# Patient Record
Sex: MALE | HISPANIC OR LATINO | URBAN - METROPOLITAN AREA
[De-identification: names, ages, dates, MRNs, and addresses within clinical notes are randomized per-mention and may not be internally consistent; named-entity substitution may affect disease eponyms.]

---

## 2022-11-25 ENCOUNTER — OFFICE (OUTPATIENT)
Dept: URBAN - METROPOLITAN AREA CLINIC 94 | Facility: CLINIC | Age: 47
Setting detail: OPHTHALMOLOGY
End: 2022-11-25

## 2022-11-25 DIAGNOSIS — Z96.1: ICD-10-CM

## 2022-11-25 DIAGNOSIS — E11.3532: ICD-10-CM

## 2022-11-25 DIAGNOSIS — H25.041: ICD-10-CM

## 2022-11-25 DIAGNOSIS — H25.89: ICD-10-CM

## 2022-11-25 PROCEDURE — 99212 OFFICE O/P EST SF 10 MIN: CPT | Performed by: OPHTHALMOLOGY

## 2022-11-25 PROCEDURE — 92250 FUNDUS PHOTOGRAPHY W/I&R: CPT | Performed by: OPHTHALMOLOGY

## 2022-11-25 ASSESSMENT — KERATOMETRY
OD_K1POWER_DIOPTERS: UTP
OS_AXISANGLE_DEGREES: 005
METHOD_AUTO_MANUAL: AUTO
OS_K2POWER_DIOPTERS: 45.00
OS_K1POWER_DIOPTERS: 42.25

## 2022-11-25 ASSESSMENT — REFRACTION_MANIFEST
OD_SPHERE: -5.75
OD_CYLINDER: -0.75
OS_CYLINDER: -1.00
OS_AXIS: 062
OD_AXIS: 085
OS_SPHERE: +1.00
OS_VA1: 20/40
OD_VA1: HM

## 2022-11-25 ASSESSMENT — VISUAL ACUITY
OD_BCVA: 20/400
OS_BCVA: 20/HM

## 2022-11-25 ASSESSMENT — SPHEQUIV_DERIVED
OS_SPHEQUIV: 0.5
OD_SPHEQUIV: -6.125
OS_SPHEQUIV: -0.5

## 2022-11-25 ASSESSMENT — CONFRONTATIONAL VISUAL FIELD TEST (CVF)
OD_FINDINGS: FULL
OS_FINDINGS: FULL

## 2022-11-25 ASSESSMENT — REFRACTION_AUTOREFRACTION
OD_CYLINDER: UTP
OD_SPHERE: UTP
OD_AXIS: UTP
OS_CYLINDER: -3.50
OS_AXIS: 097
OS_SPHERE: +1.25

## 2022-11-25 ASSESSMENT — TONOMETRY
OD_IOP_MMHG: 15
OS_IOP_MMHG: 15

## 2022-11-25 ASSESSMENT — AXIALLENGTH_DERIVED
OS_AL: 23.3536
OS_AL: 23.7417

## 2022-11-30 ENCOUNTER — OFFICE (OUTPATIENT)
Dept: URBAN - METROPOLITAN AREA CLINIC 94 | Facility: CLINIC | Age: 47
Setting detail: OPHTHALMOLOGY
End: 2022-11-30

## 2022-11-30 DIAGNOSIS — H25.89: ICD-10-CM

## 2022-11-30 DIAGNOSIS — E11.3511: ICD-10-CM

## 2022-11-30 DIAGNOSIS — E11.3512: ICD-10-CM

## 2022-11-30 PROBLEM — H25.11 CATARACT SENILE NUCLEAR SCLEROSIS; RIGHT EYE: Status: ACTIVE | Noted: 2022-11-25

## 2022-11-30 PROCEDURE — 67028 INJECTION EYE DRUG: CPT | Performed by: OPHTHALMOLOGY

## 2022-11-30 PROCEDURE — 92014 COMPRE OPH EXAM EST PT 1/>: CPT | Performed by: OPHTHALMOLOGY

## 2022-11-30 PROCEDURE — 92134 CPTRZ OPH DX IMG PST SGM RTA: CPT | Performed by: OPHTHALMOLOGY

## 2022-11-30 ASSESSMENT — REFRACTION_AUTOREFRACTION
OS_AXIS: 097
OS_CYLINDER: -3.50
OD_AXIS: UTP
OD_CYLINDER: UTP
OS_SPHERE: +1.25
OD_SPHERE: UTP

## 2022-11-30 ASSESSMENT — REFRACTION_MANIFEST
OS_SPHERE: +1.00
OD_CYLINDER: -0.75
OD_VA1: HM
OS_AXIS: 062
OD_AXIS: 085
OS_CYLINDER: -1.00
OS_VA1: 20/40
OD_SPHERE: -5.75

## 2022-11-30 ASSESSMENT — CONFRONTATIONAL VISUAL FIELD TEST (CVF)
OD_FINDINGS: FULL
OS_FINDINGS: FULL

## 2022-11-30 ASSESSMENT — VISUAL ACUITY
OD_BCVA: 20/250
OS_BCVA: HM

## 2022-11-30 ASSESSMENT — SPHEQUIV_DERIVED
OS_SPHEQUIV: -0.5
OD_SPHEQUIV: -6.125
OS_SPHEQUIV: 0.5

## 2022-11-30 ASSESSMENT — TONOMETRY
OS_IOP_MMHG: 21
OD_IOP_MMHG: 20

## 2023-01-13 ENCOUNTER — OFFICE (OUTPATIENT)
Dept: URBAN - METROPOLITAN AREA CLINIC 94 | Facility: CLINIC | Age: 48
Setting detail: OPHTHALMOLOGY
End: 2023-01-13
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
## 2023-01-13 PROCEDURE — NO SHOW FE NO SHOW FEE: Performed by: OPHTHALMOLOGY

## 2023-02-20 ENCOUNTER — INPATIENT (INPATIENT)
Facility: HOSPITAL | Age: 48
LOS: 6 days | Discharge: HOME | DRG: 286 | End: 2023-02-27
Attending: FAMILY MEDICINE | Admitting: STUDENT IN AN ORGANIZED HEALTH CARE EDUCATION/TRAINING PROGRAM
Payer: MEDICAID

## 2023-02-20 VITALS
OXYGEN SATURATION: 98 % | RESPIRATION RATE: 18 BRPM | DIASTOLIC BLOOD PRESSURE: 48 MMHG | TEMPERATURE: 99 F | HEART RATE: 98 BPM | SYSTOLIC BLOOD PRESSURE: 66 MMHG

## 2023-02-20 DIAGNOSIS — I21.4 NON-ST ELEVATION (NSTEMI) MYOCARDIAL INFARCTION: ICD-10-CM

## 2023-02-20 DIAGNOSIS — E11.9 TYPE 2 DIABETES MELLITUS WITHOUT COMPLICATIONS: ICD-10-CM

## 2023-02-20 DIAGNOSIS — N17.9 ACUTE KIDNEY FAILURE, UNSPECIFIED: ICD-10-CM

## 2023-02-20 LAB
ALBUMIN SERPL ELPH-MCNC: 3.2 G/DL — LOW (ref 3.3–5.2)
ALP SERPL-CCNC: 162 U/L — HIGH (ref 40–120)
ALT FLD-CCNC: 6 U/L — SIGNIFICANT CHANGE UP
ANION GAP SERPL CALC-SCNC: 13 MMOL/L — SIGNIFICANT CHANGE UP (ref 5–17)
APTT BLD: 35.6 SEC — HIGH (ref 27.5–35.5)
AST SERPL-CCNC: 14 U/L — SIGNIFICANT CHANGE UP
BASE EXCESS BLDV CALC-SCNC: 5.7 MMOL/L — HIGH (ref -2–3)
BASOPHILS # BLD AUTO: 0.03 K/UL — SIGNIFICANT CHANGE UP (ref 0–0.2)
BASOPHILS NFR BLD AUTO: 0.3 % — SIGNIFICANT CHANGE UP (ref 0–2)
BILIRUB SERPL-MCNC: 0.2 MG/DL — LOW (ref 0.4–2)
BUN SERPL-MCNC: 29.4 MG/DL — HIGH (ref 8–20)
CA-I SERPL-SCNC: 1.16 MMOL/L — SIGNIFICANT CHANGE UP (ref 1.15–1.33)
CALCIUM SERPL-MCNC: 9 MG/DL — SIGNIFICANT CHANGE UP (ref 8.4–10.5)
CHLORIDE BLDV-SCNC: 93 MMOL/L — LOW (ref 96–108)
CHLORIDE SERPL-SCNC: 90 MMOL/L — LOW (ref 96–108)
CO2 SERPL-SCNC: 27 MMOL/L — SIGNIFICANT CHANGE UP (ref 22–29)
CREAT SERPL-MCNC: 1.36 MG/DL — HIGH (ref 0.5–1.3)
EGFR: 65 ML/MIN/1.73M2 — SIGNIFICANT CHANGE UP
EOSINOPHIL # BLD AUTO: 0.02 K/UL — SIGNIFICANT CHANGE UP (ref 0–0.5)
EOSINOPHIL NFR BLD AUTO: 0.2 % — SIGNIFICANT CHANGE UP (ref 0–6)
GAS PNL BLDV: 131 MMOL/L — LOW (ref 136–145)
GAS PNL BLDV: SIGNIFICANT CHANGE UP
GLUCOSE BLDV-MCNC: 155 MG/DL — HIGH (ref 70–99)
GLUCOSE SERPL-MCNC: 148 MG/DL — HIGH (ref 70–99)
HCO3 BLDV-SCNC: 31 MMOL/L — HIGH (ref 22–29)
HCT VFR BLD CALC: 27.2 % — LOW (ref 39–50)
HCT VFR BLDA CALC: 29 % — SIGNIFICANT CHANGE UP
HGB BLD CALC-MCNC: 9.7 G/DL — LOW (ref 12.6–17.4)
HGB BLD-MCNC: 8.8 G/DL — LOW (ref 13–17)
IMM GRANULOCYTES NFR BLD AUTO: 0.4 % — SIGNIFICANT CHANGE UP (ref 0–0.9)
INR BLD: 1.3 RATIO — HIGH (ref 0.88–1.16)
LACTATE BLDV-MCNC: 1.1 MMOL/L — SIGNIFICANT CHANGE UP (ref 0.5–2)
LYMPHOCYTES # BLD AUTO: 1.75 K/UL — SIGNIFICANT CHANGE UP (ref 1–3.3)
LYMPHOCYTES # BLD AUTO: 18.4 % — SIGNIFICANT CHANGE UP (ref 13–44)
MCHC RBC-ENTMCNC: 27 PG — SIGNIFICANT CHANGE UP (ref 27–34)
MCHC RBC-ENTMCNC: 32.4 GM/DL — SIGNIFICANT CHANGE UP (ref 32–36)
MCV RBC AUTO: 83.4 FL — SIGNIFICANT CHANGE UP (ref 80–100)
MONOCYTES # BLD AUTO: 0.69 K/UL — SIGNIFICANT CHANGE UP (ref 0–0.9)
MONOCYTES NFR BLD AUTO: 7.3 % — SIGNIFICANT CHANGE UP (ref 2–14)
NEUTROPHILS # BLD AUTO: 6.96 K/UL — SIGNIFICANT CHANGE UP (ref 1.8–7.4)
NEUTROPHILS NFR BLD AUTO: 73.4 % — SIGNIFICANT CHANGE UP (ref 43–77)
PCO2 BLDV: 52 MMHG — SIGNIFICANT CHANGE UP (ref 42–55)
PH BLDV: 7.38 — SIGNIFICANT CHANGE UP (ref 7.32–7.43)
PLATELET # BLD AUTO: 587 K/UL — HIGH (ref 150–400)
PO2 BLDV: 45 MMHG — SIGNIFICANT CHANGE UP (ref 25–45)
POTASSIUM BLDV-SCNC: 4.7 MMOL/L — SIGNIFICANT CHANGE UP (ref 3.5–5.1)
POTASSIUM SERPL-MCNC: 4.8 MMOL/L — SIGNIFICANT CHANGE UP (ref 3.5–5.3)
POTASSIUM SERPL-SCNC: 4.8 MMOL/L — SIGNIFICANT CHANGE UP (ref 3.5–5.3)
PROT SERPL-MCNC: 8.1 G/DL — SIGNIFICANT CHANGE UP (ref 6.6–8.7)
PROTHROM AB SERPL-ACNC: 15.1 SEC — HIGH (ref 10.5–13.4)
RBC # BLD: 3.26 M/UL — LOW (ref 4.2–5.8)
RBC # FLD: 14.8 % — HIGH (ref 10.3–14.5)
SAO2 % BLDV: 74.1 % — SIGNIFICANT CHANGE UP
SARS-COV-2 RNA SPEC QL NAA+PROBE: SIGNIFICANT CHANGE UP
SODIUM SERPL-SCNC: 130 MMOL/L — LOW (ref 135–145)
TROPONIN T SERPL-MCNC: 0.23 NG/ML — HIGH (ref 0–0.06)
TSH SERPL-MCNC: 1.45 UIU/ML — SIGNIFICANT CHANGE UP (ref 0.27–4.2)
WBC # BLD: 9.49 K/UL — SIGNIFICANT CHANGE UP (ref 3.8–10.5)
WBC # FLD AUTO: 9.49 K/UL — SIGNIFICANT CHANGE UP (ref 3.8–10.5)

## 2023-02-20 PROCEDURE — 71045 X-RAY EXAM CHEST 1 VIEW: CPT | Mod: 26

## 2023-02-20 PROCEDURE — 99223 1ST HOSP IP/OBS HIGH 75: CPT

## 2023-02-20 PROCEDURE — 93010 ELECTROCARDIOGRAM REPORT: CPT | Mod: 76

## 2023-02-20 PROCEDURE — 99291 CRITICAL CARE FIRST HOUR: CPT

## 2023-02-20 RX ORDER — SODIUM CHLORIDE 9 MG/ML
1000 INJECTION, SOLUTION INTRAVENOUS ONCE
Refills: 0 | Status: COMPLETED | OUTPATIENT
Start: 2023-02-20 | End: 2023-02-20

## 2023-02-20 RX ORDER — SODIUM CHLORIDE 9 MG/ML
2000 INJECTION, SOLUTION INTRAVENOUS ONCE
Refills: 0 | Status: COMPLETED | OUTPATIENT
Start: 2023-02-20 | End: 2023-02-20

## 2023-02-20 RX ORDER — INFLUENZA VIRUS VACCINE 15; 15; 15; 15 UG/.5ML; UG/.5ML; UG/.5ML; UG/.5ML
0.5 SUSPENSION INTRAMUSCULAR ONCE
Refills: 0 | Status: DISCONTINUED | OUTPATIENT
Start: 2023-02-20 | End: 2023-02-27

## 2023-02-20 RX ORDER — HEPARIN SODIUM 5000 [USP'U]/ML
2900 INJECTION INTRAVENOUS; SUBCUTANEOUS ONCE
Refills: 0 | Status: COMPLETED | OUTPATIENT
Start: 2023-02-20 | End: 2023-02-20

## 2023-02-20 RX ORDER — HEPARIN SODIUM 5000 [USP'U]/ML
INJECTION INTRAVENOUS; SUBCUTANEOUS
Qty: 25000 | Refills: 0 | Status: DISCONTINUED | OUTPATIENT
Start: 2023-02-20 | End: 2023-02-22

## 2023-02-20 RX ORDER — ASPIRIN/CALCIUM CARB/MAGNESIUM 324 MG
324 TABLET ORAL ONCE
Refills: 0 | Status: COMPLETED | OUTPATIENT
Start: 2023-02-20 | End: 2023-02-20

## 2023-02-20 RX ORDER — HEPARIN SODIUM 5000 [USP'U]/ML
2900 INJECTION INTRAVENOUS; SUBCUTANEOUS EVERY 6 HOURS
Refills: 0 | Status: DISCONTINUED | OUTPATIENT
Start: 2023-02-20 | End: 2023-02-22

## 2023-02-20 RX ADMIN — SODIUM CHLORIDE 1000 MILLILITER(S): 9 INJECTION, SOLUTION INTRAVENOUS at 18:59

## 2023-02-20 RX ADMIN — SODIUM CHLORIDE 2000 MILLILITER(S): 9 INJECTION, SOLUTION INTRAVENOUS at 17:06

## 2023-02-20 RX ADMIN — HEPARIN SODIUM 600 UNIT(S)/HR: 5000 INJECTION INTRAVENOUS; SUBCUTANEOUS at 18:53

## 2023-02-20 RX ADMIN — Medication 324 MILLIGRAM(S): at 18:50

## 2023-02-20 RX ADMIN — SODIUM CHLORIDE 2000 MILLILITER(S): 9 INJECTION, SOLUTION INTRAVENOUS at 19:12

## 2023-02-20 RX ADMIN — HEPARIN SODIUM 2900 UNIT(S): 5000 INJECTION INTRAVENOUS; SUBCUTANEOUS at 18:50

## 2023-02-20 NOTE — ED PROVIDER NOTE - CLINICAL SUMMARY MEDICAL DECISION MAKING FREE TEXT BOX
47y Male with weakness and near syncope without chest pain or shortness of breath noted to be hypotensive on arrival. On examination neurovascularly intact with soft, nontender abdomen. Concerning for but not limited to ACS, arrythmia, thyroid disorder, electrolyte derangement. 47y Male with weakness and near syncope without chest pain or shortness of breath noted to be hypotensive on arrival. On examination neurovascularly intact with soft, nontender abdomen. Concerning for but not limited to ACS, arrythmia, thyroid disorder, electrolyte derangement. Labs, imaging reviewed. Treated for NSTEMI. Will admit to stepdown.

## 2023-02-20 NOTE — ED ADULT NURSE NOTE - NSICDXPASTMEDICALHX_GEN_ALL_CORE_FT
PAST MEDICAL HISTORY:  Diabetes mellitus without complication     No pertinent past medical history
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
## 2023-02-20 NOTE — H&P ADULT - HISTORY OF PRESENT ILLNESS
47yoM hx DM, on insulin, who was sent from an outpatient clinical for syncopal event.  Pt states he was going to clinical for routine visit, was in the waiting area when he had acute onset of lightheadedness, generalized weakness and almost passed out. Pt reports few episodes of lightheadedness and near syncopal events throughout the week. Pt denies nausea, vomiting, diarrhea and states his appetite has been ‘okay’.  He reports he had been constipated in the week but had a bowel movement while in the ED.  While in ED, pt also with 2 episodes of hypotension that occurred upon standing.  Admission labs notable for elevated troponin. 47yoM hx DM, on insulin, who was sent from an outpatient clinical for near syncopal event.  Pt states he was going to clinical for routine visit, was in the waiting area when he had acute onset of lightheadedness, generalized weakness and almost passed out. Pt reports few episodes of lightheadedness and near syncopal events throughout the week. Pt denies nausea, vomiting, diarrhea and states his appetite has been ‘okay’.  He reports he had been constipated in the week but had a bowel movement while in the ED.  While in ED, pt also with 2 episodes of hypotension that occurred upon standing.  Admission labs notable for elevated troponin.

## 2023-02-20 NOTE — ED PROVIDER NOTE - CARE PLAN
Principal Discharge DX:	Near syncope   1 Principal Discharge DX:	NSTEMI (non-ST elevation myocardial infarction)  Secondary Diagnosis:	Near syncope

## 2023-02-20 NOTE — H&P ADULT - NSICDXPASTSURGICALHX_GEN_ALL_CORE_FT
Per depo protocol , gave pt depo injection to Right ventrogluteal area . Pt advised to remain in waiting area for 15 min , to ensure no adverse reactions . Pt given future depo dates. Lorenzo SOSA    
PAST SURGICAL HISTORY:  H/O hand surgery

## 2023-02-20 NOTE — CONSULT NOTE ADULT - PROBLEM SELECTOR RECOMMENDATION 3
Avoid nephrotoxic meds, monitor urine output  - no ARBs/ACEi at this juncture  - low K diet,   - repeat BMP in AM    case d/w Dr. Meyers Avoid nephrotoxic meds, monitor urine output  - no ARBs/ACEi at this juncture  - low K diet,   - repeat BMP in AM    case d/w Dr. Neely Avoid nephrotoxic meds, monitor urine output  - no ARBs/ACEi at this juncture  - low K diet,   - repeat BMP in AM    case d/w Dr. Barajas

## 2023-02-20 NOTE — H&P ADULT - NSHPLABSRESULTS_GEN_ALL_CORE
02-21    130<L>  |  93<L>  |  28.2<H>  ----------------------------<  267<H>  4.7   |  26.0  |  1.19    Ca    8.3<L>      21 Feb 2023 00:36  Mg     1.7     02-21    TPro  8.1  /  Alb  3.2<L>  /  TBili  0.2<L>  /  DBili  x   /  AST  14  /  ALT  6   /  AlkPhos  162<H>  02-20                          8.6    7.73  )-----------( 588      ( 21 Feb 2023 00:36 )             26.8

## 2023-02-20 NOTE — CONSULT NOTE ADULT - ASSESSMENT
48yo M w/ T2DM arrived by ambulance after experincing weakness and near syncope without chest pain or shortness of breath.  ECG no acute changes  Trop: 0.23 c/w NSTEMi
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
## 2023-02-20 NOTE — ED ADULT NURSE REASSESSMENT NOTE - NS ED NURSE REASSESS COMMENT FT1
report given to didier grigsby at 2145. placed on tele box, conitnued cardiac monitoring in place. pt moved to cdu 3r. rn made aware of transfer of care. pt educated on plan of care, pt able to successfully teach back plan of care to RN, RN will continue to reeducate pt during hospital stay. rr even and unlabored.

## 2023-02-20 NOTE — ED PROVIDER NOTE - PHYSICAL EXAMINATION
General: nontoxic appearing in no acute distress. Alert and cooperative.   Head: Normocephalic, atraumatic.  Eyes: PERRLA. No conjunctival injection. No scleral icterus. EOMI  ENMT: Atraumatic external nose and ears. Moist mucous membranes. Oropharynx clear.  Neck: Soft and supple. Full ROM without pain.   Cardiac: Regular rate and regular rhythm. No murmurs. Peripheral pulses 2+ and symmetric in all extremities. No LE edema.  Resp: Unlabored respiratory effort. Lungs CTAB.   Abd: Soft, non-tender, non-distended.   MSK: Spine midline and non-tender.   Skin: Warm and dry.   Neuro: AO x 3. Moves all extremities symmetrically. Motor strength and sensation grossly intact.

## 2023-02-20 NOTE — ED ADULT NURSE REASSESSMENT NOTE - NS ED NURSE REASSESS COMMENT FT1
pt with repeat EKG and accurate weight obtained. Heparin gtt initiated and cosigned by CHRIS Herman. pt with hypotension after ambulating to bathroom. now hypertensive 174/101.

## 2023-02-20 NOTE — ED PROVIDER NOTE - ATTENDING CONTRIBUTION TO CARE
47y Male with history of DM BIBEMS for weakness and near syncope while at clinic without headache, chest pain, shortness of breath. Patient reports not BM x 8 days but reports flatus without abdominal pain, nausea, vomiting. Denies fevers, chills, cough, focal neurologic symptoms.. denies any rectal bleeding, trauma, new meds, recent illness, AMS.    On arrival pt hypotensive to the 60s-70s. Improved with iVF. No evidence of bleeding or sepsis on initial eval, but sepsis workup and acs workup initiated. BP improved with IVF bolus. + trop. no stemi on ecg. will treat as NSTEMI, cards consult, heparin, admit

## 2023-02-20 NOTE — H&P ADULT - ASSESSMENT
47yoM hx DM, on insulin, who was sent from cardiology office for near syncopal event, with episodes of hypotension while in ED upon standing and with labs notable for elevated troponin     NSTEMI  -Possibly demand ischemia from syncopal/hypotensive episodes  -Troponin down-trended from 0.23 to 0.15, normal CK on repeat  -EKG showing changes, new TWI in II, III, V4-V6 on 3rd EKG  -Seen by cardiology PA overnight, tentative plan for cardiac cath in AM  -Started on heparin infusion   -Received ASA 324mg x 1  -Will start ASA 81mg daily  -Start atorvastatin 40mg daily  -Hold off on B-blocker due to hypotensive episodes   -TTE ordered  -NPO after midnight   -Telemetry monitoring     Near syncope   -Suspect due to hypovolemia/hypotension  -Ddx include transient arrythmia event given NSTEMI   -Received 3L of LR while in ED  -Seen by cardiology, plan as above  -Will transition from step down to telemetry q4hr VS as BP has been stable for several hours    Hyponatremia   -Admission Na 130, received 3L by ED  -Repeat corrected Na (adjusted for glucose) is 133  -Will check urine electrolytes   -Trend BMP    Anemia  -Normocytic anemia, Hgb 8 range, no prior for comparison  -Denies bleeding  -Check iron studies  -Obtain type and screen in AM  -On heparin infusion as above, monitor CBC closely     Hx DM  -Pt states on insulin 10U once daily, does not recall type of insulin  -ISS for now with FS q6hr while NPO awaiting cardiac cath     Medication management  -Call pharmacy in AM to find out what type of insulin pt uses    Prophylactic measure   -Heparin infusion

## 2023-02-20 NOTE — PATIENT PROFILE ADULT - FALL HARM RISK - HARM RISK INTERVENTIONS
Assistance with ambulation/Assistance OOB with selected safe patient handling equipment/Communicate Risk of Fall with Harm to all staff/Discuss with provider need for PT consult/Monitor gait and stability/Reinforce activity limits and safety measures with patient and family/Tailored Fall Risk Interventions/Visual Cue: Yellow wristband and red socks/Bed in lowest position, wheels locked, appropriate side rails in place/Call bell, personal items and telephone in reach/Instruct patient to call for assistance before getting out of bed or chair/Non-slip footwear when patient is out of bed/[place] to call system/Physically safe environment - no spills, clutter or unnecessary equipment/Purposeful Proactive Rounding/Room/bathroom lighting operational, light cord in reach

## 2023-02-20 NOTE — CONSULT NOTE ADULT - PROBLEM SELECTOR RECOMMENDATION 2
Spoke to patient about strict DM control and daily exercise   - on oral diabetic meds and Insulin  - educated on risks of uncontrolled diabetes and consequences of it, patient understands  - on Admelog sliding scale, pre-meal FS  - 1800 calorie diabetic diet  - optimal weight management encouraged

## 2023-02-20 NOTE — ED ADULT NURSE NOTE - OBJECTIVE STATEMENT
assumed care of pt in  at 1650. md pappas and  jair at bedside. pt sent in from [facility] clinic for near syncopal episode. denies loc, dizziness, chest pain or sob. pt c/o of weakness for last couple of weeks. denies fevers. noted to be hypotensive, md aware. placed on cardiac monitor and . anxo4. pt educated on plan of care, pt able to successfully teach back plan of care to RN, RN will continue to reeducate pt during hospital stay.

## 2023-02-20 NOTE — CONSULT NOTE ADULT - PROBLEM SELECTOR RECOMMENDATION 9
Admit to Tele for acute arrhythmia monitoring, check labs including CBC, BMP, trend CE x3, ECG and CXR  - FLP and A1C in AM, check ECG and CXR tonight  - on full dose Heparin gtt, keep PTT 60-80sec, monitor daily CBC and Plt count  - high dose Statin (Lipitor 80mg nightly) BB, and ASA  - low cholesterol diet, monitor LFTs  - will schedule for GABRIELA in AM to assess functional/structural status  - will schedule for Cath tomorrow  - pain management as needed (NTG/MSO4)

## 2023-02-21 DIAGNOSIS — Z98.890 OTHER SPECIFIED POSTPROCEDURAL STATES: Chronic | ICD-10-CM

## 2023-02-21 LAB
A1C WITH ESTIMATED AVERAGE GLUCOSE RESULT: 8.3 % — HIGH (ref 4–5.6)
ANION GAP SERPL CALC-SCNC: 11 MMOL/L — SIGNIFICANT CHANGE UP (ref 5–17)
APTT BLD: 33.7 SEC — SIGNIFICANT CHANGE UP (ref 27.5–35.5)
APTT BLD: 34.1 SEC — SIGNIFICANT CHANGE UP (ref 27.5–35.5)
APTT BLD: 58.6 SEC — HIGH (ref 27.5–35.5)
BLD GP AB SCN SERPL QL: SIGNIFICANT CHANGE UP
BUN SERPL-MCNC: 28.2 MG/DL — HIGH (ref 8–20)
CALCIUM SERPL-MCNC: 8.3 MG/DL — LOW (ref 8.4–10.5)
CHLORIDE SERPL-SCNC: 93 MMOL/L — LOW (ref 96–108)
CHOLEST SERPL-MCNC: 78 MG/DL — SIGNIFICANT CHANGE UP
CK SERPL-CCNC: 35 U/L — SIGNIFICANT CHANGE UP (ref 30–200)
CK SERPL-CCNC: 40 U/L — SIGNIFICANT CHANGE UP (ref 30–200)
CO2 SERPL-SCNC: 26 MMOL/L — SIGNIFICANT CHANGE UP (ref 22–29)
CREAT ?TM UR-MCNC: 56 MG/DL — SIGNIFICANT CHANGE UP
CREAT SERPL-MCNC: 1.19 MG/DL — SIGNIFICANT CHANGE UP (ref 0.5–1.3)
EGFR: 76 ML/MIN/1.73M2 — SIGNIFICANT CHANGE UP
ESTIMATED AVERAGE GLUCOSE: 192 MG/DL — HIGH (ref 68–114)
FERRITIN SERPL-MCNC: 538 NG/ML — HIGH (ref 30–400)
FOLATE SERPL-MCNC: 15.3 NG/ML — SIGNIFICANT CHANGE UP
GLUCOSE BLDC GLUCOMTR-MCNC: 160 MG/DL — HIGH (ref 70–99)
GLUCOSE BLDC GLUCOMTR-MCNC: 186 MG/DL — HIGH (ref 70–99)
GLUCOSE BLDC GLUCOMTR-MCNC: 186 MG/DL — HIGH (ref 70–99)
GLUCOSE BLDC GLUCOMTR-MCNC: 274 MG/DL — HIGH (ref 70–99)
GLUCOSE BLDC GLUCOMTR-MCNC: 278 MG/DL — HIGH (ref 70–99)
GLUCOSE SERPL-MCNC: 267 MG/DL — HIGH (ref 70–99)
HCT VFR BLD CALC: 26.8 % — LOW (ref 39–50)
HDLC SERPL-MCNC: 20 MG/DL — LOW
HGB BLD-MCNC: 8.6 G/DL — LOW (ref 13–17)
IRON SATN MFR SERPL: 13 % — LOW (ref 16–55)
IRON SATN MFR SERPL: 24 UG/DL — LOW (ref 59–158)
LIPID PNL WITH DIRECT LDL SERPL: 29 MG/DL — SIGNIFICANT CHANGE UP
MAGNESIUM SERPL-MCNC: 1.7 MG/DL — SIGNIFICANT CHANGE UP (ref 1.6–2.6)
MCHC RBC-ENTMCNC: 27 PG — SIGNIFICANT CHANGE UP (ref 27–34)
MCHC RBC-ENTMCNC: 32.1 GM/DL — SIGNIFICANT CHANGE UP (ref 32–36)
MCV RBC AUTO: 84.3 FL — SIGNIFICANT CHANGE UP (ref 80–100)
NON HDL CHOLESTEROL: 58 MG/DL — SIGNIFICANT CHANGE UP
OSMOLALITY UR: 373 MOSM/KG — SIGNIFICANT CHANGE UP (ref 300–1000)
PLATELET # BLD AUTO: 588 K/UL — HIGH (ref 150–400)
POTASSIUM SERPL-MCNC: 4.7 MMOL/L — SIGNIFICANT CHANGE UP (ref 3.5–5.3)
POTASSIUM SERPL-SCNC: 4.7 MMOL/L — SIGNIFICANT CHANGE UP (ref 3.5–5.3)
RBC # BLD: 3.18 M/UL — LOW (ref 4.2–5.8)
RBC # FLD: 14.6 % — HIGH (ref 10.3–14.5)
SODIUM SERPL-SCNC: 130 MMOL/L — LOW (ref 135–145)
SODIUM UR-SCNC: 71 MMOL/L — SIGNIFICANT CHANGE UP
TIBC SERPL-MCNC: 180 UG/DL — LOW (ref 220–430)
TRANSFERRIN SERPL-MCNC: 126 MG/DL — LOW (ref 180–329)
TRIGL SERPL-MCNC: 143 MG/DL — SIGNIFICANT CHANGE UP
TROPONIN T SERPL-MCNC: 0.14 NG/ML — HIGH (ref 0–0.06)
TROPONIN T SERPL-MCNC: 0.15 NG/ML — HIGH (ref 0–0.06)
VIT B12 SERPL-MCNC: 954 PG/ML — SIGNIFICANT CHANGE UP (ref 232–1245)
WBC # BLD: 7.73 K/UL — SIGNIFICANT CHANGE UP (ref 3.8–10.5)
WBC # FLD AUTO: 7.73 K/UL — SIGNIFICANT CHANGE UP (ref 3.8–10.5)

## 2023-02-21 PROCEDURE — 99284 EMERGENCY DEPT VISIT MOD MDM: CPT

## 2023-02-21 PROCEDURE — 99233 SBSQ HOSP IP/OBS HIGH 50: CPT

## 2023-02-21 PROCEDURE — 93306 TTE W/DOPPLER COMPLETE: CPT | Mod: 26

## 2023-02-21 PROCEDURE — 93010 ELECTROCARDIOGRAM REPORT: CPT

## 2023-02-21 RX ORDER — MAGNESIUM SULFATE 500 MG/ML
1 VIAL (ML) INJECTION ONCE
Refills: 0 | Status: COMPLETED | OUTPATIENT
Start: 2023-02-21 | End: 2023-02-21

## 2023-02-21 RX ORDER — GLUCAGON INJECTION, SOLUTION 0.5 MG/.1ML
1 INJECTION, SOLUTION SUBCUTANEOUS ONCE
Refills: 0 | Status: DISCONTINUED | OUTPATIENT
Start: 2023-02-21 | End: 2023-02-27

## 2023-02-21 RX ORDER — DEXTROSE 50 % IN WATER 50 %
25 SYRINGE (ML) INTRAVENOUS ONCE
Refills: 0 | Status: DISCONTINUED | OUTPATIENT
Start: 2023-02-21 | End: 2023-02-27

## 2023-02-21 RX ORDER — METOPROLOL TARTRATE 50 MG
25 TABLET ORAL
Refills: 0 | Status: DISCONTINUED | OUTPATIENT
Start: 2023-02-21 | End: 2023-02-27

## 2023-02-21 RX ORDER — DEXTROSE 50 % IN WATER 50 %
12.5 SYRINGE (ML) INTRAVENOUS ONCE
Refills: 0 | Status: DISCONTINUED | OUTPATIENT
Start: 2023-02-21 | End: 2023-02-27

## 2023-02-21 RX ORDER — ASPIRIN/CALCIUM CARB/MAGNESIUM 324 MG
81 TABLET ORAL DAILY
Refills: 0 | Status: DISCONTINUED | OUTPATIENT
Start: 2023-02-21 | End: 2023-02-27

## 2023-02-21 RX ORDER — INSULIN LISPRO 100/ML
VIAL (ML) SUBCUTANEOUS EVERY 6 HOURS
Refills: 0 | Status: DISCONTINUED | OUTPATIENT
Start: 2023-02-21 | End: 2023-02-22

## 2023-02-21 RX ORDER — METOPROLOL TARTRATE 50 MG
5 TABLET ORAL ONCE
Refills: 0 | Status: COMPLETED | OUTPATIENT
Start: 2023-02-21 | End: 2023-02-21

## 2023-02-21 RX ORDER — DEXTROSE 50 % IN WATER 50 %
15 SYRINGE (ML) INTRAVENOUS ONCE
Refills: 0 | Status: DISCONTINUED | OUTPATIENT
Start: 2023-02-21 | End: 2023-02-27

## 2023-02-21 RX ORDER — ATORVASTATIN CALCIUM 80 MG/1
40 TABLET, FILM COATED ORAL AT BEDTIME
Refills: 0 | Status: DISCONTINUED | OUTPATIENT
Start: 2023-02-21 | End: 2023-02-27

## 2023-02-21 RX ORDER — SODIUM CHLORIDE 9 MG/ML
1000 INJECTION, SOLUTION INTRAVENOUS
Refills: 0 | Status: DISCONTINUED | OUTPATIENT
Start: 2023-02-21 | End: 2023-02-27

## 2023-02-21 RX ORDER — ACETAMINOPHEN 500 MG
650 TABLET ORAL EVERY 6 HOURS
Refills: 0 | Status: DISCONTINUED | OUTPATIENT
Start: 2023-02-21 | End: 2023-02-27

## 2023-02-21 RX ADMIN — Medication 25 MILLIGRAM(S): at 18:21

## 2023-02-21 RX ADMIN — Medication 3: at 23:41

## 2023-02-21 RX ADMIN — Medication 1: at 13:58

## 2023-02-21 RX ADMIN — HEPARIN SODIUM 900 UNIT(S)/HR: 5000 INJECTION INTRAVENOUS; SUBCUTANEOUS at 20:06

## 2023-02-21 RX ADMIN — HEPARIN SODIUM 900 UNIT(S)/HR: 5000 INJECTION INTRAVENOUS; SUBCUTANEOUS at 08:32

## 2023-02-21 RX ADMIN — Medication 1: at 18:11

## 2023-02-21 RX ADMIN — Medication 100 GRAM(S): at 04:24

## 2023-02-21 RX ADMIN — Medication 81 MILLIGRAM(S): at 13:57

## 2023-02-21 RX ADMIN — HEPARIN SODIUM 750 UNIT(S)/HR: 5000 INJECTION INTRAVENOUS; SUBCUTANEOUS at 01:24

## 2023-02-21 RX ADMIN — HEPARIN SODIUM 2900 UNIT(S): 5000 INJECTION INTRAVENOUS; SUBCUTANEOUS at 08:37

## 2023-02-21 RX ADMIN — HEPARIN SODIUM 900 UNIT(S)/HR: 5000 INJECTION INTRAVENOUS; SUBCUTANEOUS at 21:14

## 2023-02-21 RX ADMIN — Medication 5 MILLIGRAM(S): at 16:47

## 2023-02-21 RX ADMIN — HEPARIN SODIUM 2900 UNIT(S): 5000 INJECTION INTRAVENOUS; SUBCUTANEOUS at 01:21

## 2023-02-21 RX ADMIN — ATORVASTATIN CALCIUM 40 MILLIGRAM(S): 80 TABLET, FILM COATED ORAL at 21:44

## 2023-02-21 RX ADMIN — Medication 3: at 06:26

## 2023-02-21 NOTE — PROGRESS NOTE ADULT - ASSESSMENT
46 y/o M hx DM, on insulin, who was sent from cardiology office for near syncopal event, with episodes of hypotension while in ED upon standing and with labs notable for elevated troponin admitted for NSTEMI.    NSTEMI  - Telemetry monitoring  - Possibly demand ischemia from syncopal/hypotensive episodes  - Troponin down-trended from 0.23 to 0.14, normal CK on repeat  - Troponin 0.14 this AM  - EKG showing changes, new TWI in II, III, V4-V6 on 3rd EKG  - Cardiology recs appreciated  - Continue heparin drip  - NPO  - Cardiac cath today   - Aspirin 81mg daily  - Lipitor 40mg nightly  - LDL 29    Near syncope   -Suspect due to hypovolemia/hypotension  -Ddx include transient arrythmia event given NSTEMI   -Received 3L of LR while in ED  -Cardio recs appreciated    Hyponatremia   - Admission Na 130, received 3L by ED  - Monitor BMP    Anemia  -Normocytic anemia, Hgb 8 range, no prior for comparison  -Denies bleeding  - Ferritin 538  - Iron 24  - TIBC 180    Hx DM  - A1c 8.3  - FS with ISS    DVT ppx  -Heparin infusion     Dispo: Pending cardiac cath

## 2023-02-21 NOTE — PROGRESS NOTE ADULT - SUBJECTIVE AND OBJECTIVE BOX
Chief complaint: NSTEMI    Patient seen and examined at bedside. No acute overnight events reported. Patient states earlier he was feeling dizzy but these symptoms have now resolved. He denies chest pain, shortness of breath, nausea or vomiting.     Vital Signs Last 24 Hrs  T(F): 97.7 (21 Feb 2023 07:42), Max: 98.9 (20 Feb 2023 16:53)  HR: 80 (21 Feb 2023 04:16) (80 - 98)  BP: 137/89 (21 Feb 2023 04:16) (66/48 - 174/101)  RR: 17 (21 Feb 2023 04:16) (16 - 18)  SpO2: 98% (21 Feb 2023 04:16) (98% - 100%)    Physical Exam:  Constitutional: alert and oriented, in no acute distress   Neck: Soft and supple  Respiratory: Clear to auscultation bilaterally, no wheezes or crackles  Cardiovascular: Regular rate and rhyhtm  Gastrointestinal: Soft, non-tender to palpation, +bs  Vascular: 2+ peripheral pulses  Neurological: A/O x 3  Musculoskeletal: 5/5 strength b/l upper and lower extremities, no lower extremity edema bilaterally    Labs:                        8.6    7.73  )-----------( 588      ( 21 Feb 2023 00:36 )             26.8   02-21    130<L>  |  93<L>  |  28.2<H>  ----------------------------<  267<H>  4.7   |  26.0  |  1.19    Ca    8.3<L>      21 Feb 2023 00:36  Mg     1.7     02-21    TPro  8.1  /  Alb  3.2<L>  /  TBili  0.2<L>  /  DBili  x   /  AST  14  /  ALT  6   /  AlkPhos  162<H>  02-20

## 2023-02-22 DIAGNOSIS — R55 SYNCOPE AND COLLAPSE: ICD-10-CM

## 2023-02-22 DIAGNOSIS — D64.9 ANEMIA, UNSPECIFIED: ICD-10-CM

## 2023-02-22 LAB
ANION GAP SERPL CALC-SCNC: 12 MMOL/L — SIGNIFICANT CHANGE UP (ref 5–17)
APTT BLD: 33.7 SEC — SIGNIFICANT CHANGE UP (ref 27.5–35.5)
APTT BLD: 42.3 SEC — HIGH (ref 27.5–35.5)
BUN SERPL-MCNC: 25 MG/DL — HIGH (ref 8–20)
CALCIUM SERPL-MCNC: 8.4 MG/DL — SIGNIFICANT CHANGE UP (ref 8.4–10.5)
CHLORIDE SERPL-SCNC: 93 MMOL/L — LOW (ref 96–108)
CO2 SERPL-SCNC: 27 MMOL/L — SIGNIFICANT CHANGE UP (ref 22–29)
CREAT SERPL-MCNC: 1.19 MG/DL — SIGNIFICANT CHANGE UP (ref 0.5–1.3)
EGFR: 76 ML/MIN/1.73M2 — SIGNIFICANT CHANGE UP
GLUCOSE BLDC GLUCOMTR-MCNC: 164 MG/DL — HIGH (ref 70–99)
GLUCOSE BLDC GLUCOMTR-MCNC: 167 MG/DL — HIGH (ref 70–99)
GLUCOSE BLDC GLUCOMTR-MCNC: 168 MG/DL — HIGH (ref 70–99)
GLUCOSE BLDC GLUCOMTR-MCNC: 179 MG/DL — HIGH (ref 70–99)
GLUCOSE BLDC GLUCOMTR-MCNC: 233 MG/DL — HIGH (ref 70–99)
GLUCOSE SERPL-MCNC: 153 MG/DL — HIGH (ref 70–99)
HCT VFR BLD CALC: 25.4 % — LOW (ref 39–50)
HGB BLD-MCNC: 8.2 G/DL — LOW (ref 13–17)
MCHC RBC-ENTMCNC: 26.9 PG — LOW (ref 27–34)
MCHC RBC-ENTMCNC: 32.3 GM/DL — SIGNIFICANT CHANGE UP (ref 32–36)
MCV RBC AUTO: 83.3 FL — SIGNIFICANT CHANGE UP (ref 80–100)
PLATELET # BLD AUTO: 559 K/UL — HIGH (ref 150–400)
POTASSIUM SERPL-MCNC: 5.3 MMOL/L — SIGNIFICANT CHANGE UP (ref 3.5–5.3)
POTASSIUM SERPL-SCNC: 5.3 MMOL/L — SIGNIFICANT CHANGE UP (ref 3.5–5.3)
RBC # BLD: 3.05 M/UL — LOW (ref 4.2–5.8)
RBC # FLD: 14.7 % — HIGH (ref 10.3–14.5)
SODIUM SERPL-SCNC: 132 MMOL/L — LOW (ref 135–145)
WBC # BLD: 7.9 K/UL — SIGNIFICANT CHANGE UP (ref 3.8–10.5)
WBC # FLD AUTO: 7.9 K/UL — SIGNIFICANT CHANGE UP (ref 3.8–10.5)

## 2023-02-22 PROCEDURE — 99233 SBSQ HOSP IP/OBS HIGH 50: CPT

## 2023-02-22 PROCEDURE — 99232 SBSQ HOSP IP/OBS MODERATE 35: CPT

## 2023-02-22 RX ORDER — ENOXAPARIN SODIUM 100 MG/ML
40 INJECTION SUBCUTANEOUS EVERY 24 HOURS
Refills: 0 | Status: DISCONTINUED | OUTPATIENT
Start: 2023-02-22 | End: 2023-02-27

## 2023-02-22 RX ORDER — INSULIN LISPRO 100/ML
VIAL (ML) SUBCUTANEOUS
Refills: 0 | Status: DISCONTINUED | OUTPATIENT
Start: 2023-02-22 | End: 2023-02-24

## 2023-02-22 RX ADMIN — ATORVASTATIN CALCIUM 40 MILLIGRAM(S): 80 TABLET, FILM COATED ORAL at 21:08

## 2023-02-22 RX ADMIN — Medication 25 MILLIGRAM(S): at 05:34

## 2023-02-22 RX ADMIN — HEPARIN SODIUM 900 UNIT(S)/HR: 5000 INJECTION INTRAVENOUS; SUBCUTANEOUS at 01:58

## 2023-02-22 RX ADMIN — HEPARIN SODIUM 1000 UNIT(S)/HR: 5000 INJECTION INTRAVENOUS; SUBCUTANEOUS at 07:26

## 2023-02-22 RX ADMIN — Medication 1: at 17:23

## 2023-02-22 RX ADMIN — Medication 81 MILLIGRAM(S): at 09:18

## 2023-02-22 RX ADMIN — Medication 1: at 12:02

## 2023-02-22 RX ADMIN — ENOXAPARIN SODIUM 40 MILLIGRAM(S): 100 INJECTION SUBCUTANEOUS at 09:18

## 2023-02-22 RX ADMIN — Medication 1: at 05:33

## 2023-02-22 RX ADMIN — Medication 25 MILLIGRAM(S): at 17:25

## 2023-02-22 RX ADMIN — HEPARIN SODIUM 1000 UNIT(S)/HR: 5000 INJECTION INTRAVENOUS; SUBCUTANEOUS at 02:11

## 2023-02-22 NOTE — PROGRESS NOTE ADULT - NS ATTEND AMEND GEN_ALL_CORE FT
Patient seen and examined by me personally. Briefly this is a 47y year old Male with relevant history of DMII presenting to the ED from clinic with a near syncopal event, found to have anemia with Hgb of 8.8 and troponin elevation to 0.23. No chest pain and no ecg changes concerning for NSTEMI.       Today troponin has trended downward. Remains chest pain free.     At this time I worry that his troponin elevation is more related to demand ischemia from his anemia rather than ACS, given lack of definitive evidence. Risks of catheterization and heparinization outweigh the benefits.     Today       Recommendations:   - continue to hold heparin gtt  - workup anemia and assess for possible bleeding and/or cancer risk  - Continue high doses statin and beta blocker  - Can perform NST vs. Cath when Hgb stable and bleeding not of concern.  - TTE without wall motion abnormality.    Modesto Barajas MD  Interventional and Structural Cardiology  [phone].

## 2023-02-22 NOTE — PROGRESS NOTE ADULT - PROBLEM SELECTOR PLAN 4
.  - unclear etiology  - iron studies sent; ferritin 583, total iron 24, TIBC 180, iron %13 .  - unclear etiology  - iron studies sent; ferritin 583, total iron 24, TIBC 180, iron %13  - history of "amenia' blood transfusions in past.

## 2023-02-22 NOTE — PROGRESS NOTE ADULT - SUBJECTIVE AND OBJECTIVE BOX
Chief Complaint:  nstemi    SUBJECTIVE / OVERNIGHT EVENTS:  No acute events reported overnight.  pt was scheduled for LHC however in light of anemia was postponed and heparin gtt was d/c'd and pt placed on lovenox for ppx.  Pt offers no acute complaints at this time.  He denies cp, palpitations, sob, abd pain, N/V.          I&O's Summary    21 Feb 2023 07:01  -  22 Feb 2023 07:00  --------------------------------------------------------  IN: 474 mL / OUT: 400 mL / NET: 74 mL          PHYSICAL EXAM:  Vital Signs Last 24 Hrs  T(C): 36.6 (22 Feb 2023 11:07), Max: 37.3 (22 Feb 2023 08:11)  T(F): 97.9 (22 Feb 2023 11:07), Max: 99.2 (22 Feb 2023 08:11)  HR: 80 (22 Feb 2023 11:07) (71 - 81)  BP: 117/79 (22 Feb 2023 11:07) (92/58 - 175/99)  BP(mean): --  RR: 19 (22 Feb 2023 11:07) (18 - 19)  SpO2: 100% (22 Feb 2023 11:07) (97% - 100%)    Parameters below as of 22 Feb 2023 08:11  Patient On (Oxygen Delivery Method): room air      GENERAL: cachectic M examined bedside, laying comfortably in bed in NAD  HEENT: NC/AT, moist oral mucosa, pale conjunctiva, sclera nonicteric  RESPIRATORY: Normal respiratory effort, no wheezing, rhonchi, rales  CARDIOVASCULAR: RRR, normal S1 and S2  ABDOMEN: soft, NT/ND, +bowel sounds, no rebound/guarding  EXTREMITIES: No cynaosis, no clubbing, no lower extremity edema  PSYCH: affect flat but cooperative  NEUROLOGY: A+O to person, place, and time, no focal neurologic deficits appreciated   SKIN: pallor         LABS:                        8.2    7.90  )-----------( 559      ( 22 Feb 2023 05:05 )             25.4     02-22    132<L>  |  93<L>  |  25.0<H>  ----------------------------<  153<H>  5.3   |  27.0  |  1.19    Ca    8.4      22 Feb 2023 05:05  Mg     1.7     02-21    TPro  8.1  /  Alb  3.2<L>  /  TBili  0.2<L>  /  DBili  x   /  AST  14  /  ALT  6   /  AlkPhos  162<H>  02-20    PT/INR - ( 20 Feb 2023 16:55 )   PT: 15.1 sec;   INR: 1.30 ratio         PTT - ( 22 Feb 2023 08:33 )  PTT:33.7 sec  CARDIAC MARKERS ( 21 Feb 2023 06:53 )  x     / 0.14 ng/mL / 35 U/L / x     / x      CARDIAC MARKERS ( 21 Feb 2023 00:36 )  x     / 0.15 ng/mL / 40 U/L / x     / x      CARDIAC MARKERS ( 20 Feb 2023 16:54 )  x     / 0.23 ng/mL / x     / x     / x              Culture - Blood (collected 20 Feb 2023 16:55)  Source: .Blood Blood-Peripheral  Preliminary Report (21 Feb 2023 22:02):    No growth to date.    Culture - Blood (collected 20 Feb 2023 16:45)  Source: .Blood Blood-Peripheral  Preliminary Report (21 Feb 2023 22:02):    No growth to date.      CAPILLARY BLOOD GLUCOSE      POCT Blood Glucose.: 179 mg/dL (22 Feb 2023 11:58)  POCT Blood Glucose.: 168 mg/dL (22 Feb 2023 07:35)  POCT Blood Glucose.: 164 mg/dL (22 Feb 2023 05:33)  POCT Blood Glucose.: 274 mg/dL (21 Feb 2023 23:35)  POCT Blood Glucose.: 186 mg/dL (21 Feb 2023 21:27)  POCT Blood Glucose.: 160 mg/dL (21 Feb 2023 18:09)        RADIOLOGY & ADDITIONAL TESTS:    < from: Xray Chest 1 View- PORTABLE-Urgent (02.20.23 @ 18:31) >  IMPRESSION: Negative chest.    < end of copied text >      < from: TTE Echo Complete w/ Contrast w/ Doppler (02.21.23 @ 16:56) >    Summary:   1. Left ventricular ejection fraction, by visual estimation, is 65 to   70%.   2. Normal global left ventricular systolic function.   3. Normal left atrial size.   4. Normal right atrial size.   5. There is no evidence of pericardial effusion.   6. Mild mitral valve regurgitation.    < end of copied text >      MEDICATIONS  (STANDING):  aspirin enteric coated 81 milliGRAM(s) Oral daily  atorvastatin 40 milliGRAM(s) Oral at bedtime  dextrose 5%. 1000 milliLiter(s) (100 mL/Hr) IV Continuous <Continuous>  dextrose 5%. 1000 milliLiter(s) (50 mL/Hr) IV Continuous <Continuous>  dextrose 50% Injectable 25 Gram(s) IV Push once  dextrose 50% Injectable 12.5 Gram(s) IV Push once  dextrose 50% Injectable 25 Gram(s) IV Push once  enoxaparin Injectable 40 milliGRAM(s) SubCutaneous every 24 hours  glucagon  Injectable 1 milliGRAM(s) IntraMuscular once  influenza   Vaccine 0.5 milliLiter(s) IntraMuscular once  insulin lispro (ADMELOG) corrective regimen sliding scale   SubCutaneous three times a day before meals  metoprolol tartrate 25 milliGRAM(s) Oral two times a day    MEDICATIONS  (PRN):  acetaminophen     Tablet .. 650 milliGRAM(s) Oral every 6 hours PRN Temp greater or equal to 38C (100.4F), Mild Pain (1 - 3), Moderate Pain (4 - 6)  dextrose Oral Gel 15 Gram(s) Oral once PRN Blood Glucose LESS THAN 70 milliGRAM(s)/deciliter

## 2023-02-22 NOTE — PROGRESS NOTE ADULT - ASSESSMENT
46 y/o M hx DM, on insulin, who was sent from cardiology office for near syncopal event, with episodes of hypotension while in ED upon standing and with labs notable for elevated troponin admitted for NSTEMI.    NSTEMI  - Telemetry monitoring  - Possibly demand ischemia from syncopal/hypotensive episodes  - Troponin down-trended from 0.23 to 0.14, normal CK on repeat  - Troponin 0.14 this AM  - EKG showing changes, new TWI in II, III, V4-V6 on 3rd EKG  - Cardiology recs appreciated  - Continue heparin drip  - NPO  - Cardiac cath today   - Aspirin 81mg daily  - Lipitor 40mg nightly  - LDL 29    Near syncope   -Suspect due to hypovolemia/hypotension  -Ddx include transient arrythmia event given NSTEMI   -Received 3L of LR while in ED  -Cardio recs appreciated    Hyponatremia   - Admission Na 130, received 3L by ED  - Monitor BMP    Anemia  -Normocytic anemia, Hgb 8 range, no prior for comparison  -Denies bleeding  - Ferritin 538  - Iron 24  - TIBC 180    Hx DM  - A1c 8.3  - FS with ISS    DVT ppx  -Heparin infusion     Dispo: Pending cardiac cath 48 y/o Russian speaking M w/ PMH of IDDM, chronic anemia (baseline Hb unknown), was sent from cardiology office for near syncopal event w/ episodes of hypotension while in ED upon standing and with labs notable for elevated troponins w/ EKG changes admitted for NSTEMI.  Trops have since trended down.  LHC postponed and heparin gtt d/c' in light of anemia.         NSTEMI  Near syncope   - No CP at this time and tele w/out events   - Possibly demand ischemia from syncopal/hypotensive episodes  - Troponins down trending (0.23-->0.14)  - EKG showing new TWI in II, III, V4-V6 on 3rd EKG  - d/c'd heparin gtt in light of H/H trending down but will c/w ASA   - c/w statin therapy   - s/p 3L of LR on admission   - Continue monitoring on telemetry monitoring  - Cardiology following and recs noted       Hypovolemic Hyponatremic hypochloremia   - suspect due to poor po intake / dehydration    - Improving after IVFs   - Encourage po intake   - Monitor BMP      Normocytic Anemia  - Pt reports hx of anemia requiring blood transfusions in the past  - Does not know baseline hb or why he has anemia   - Clinically malnutritioned which can contribute   - Slight trend down since admission likely dilutional given 3Ls IVF given on admission   - Iron panel noted and will place on short course of venofer then transition to po   - Will check B12 and folate levels too   - No reports of melena or hematochezia but elevated BUN/Cr ratio could be from dehydration but will order FOBT   - Monitor CBC and transfuse for Hb<8       IDDM  - A1c 8.3  - BG at goal of <180 but if hyperglycemia consider basal/bolus insulin regimen   - ISS and hypoglycemic protocol in place       Severe protein calorie malnutrition   - Pt cachectic   - Has poor po intake   - Unclear if has underlying malignancy   - Will try and obtain colateral information from [facility]   - Will consult nutrition          VTE ppx: lovenox sq     Dispo: pt remains acute requiring inpt hospitalization.  Pending C once medically stable.

## 2023-02-22 NOTE — PROGRESS NOTE ADULT - PROBLEM SELECTOR PLAN 2
.  - elevated trop, WALESKA, anemia on presenting labs  - tele- SR, alarms  - TTE normal EF, no rwma  - ischemic work up pending.

## 2023-02-22 NOTE — PROGRESS NOTE ADULT - PROBLEM SELECTOR PLAN 1
.  - TTE EF 65-70%, no RWMA  - Troponin trending down 0.23, 0.15, 0.14  - heparin gtt discontinued secondary to low hbg  - eventual NST vs Cath possible today, keep NPO awaiting discussion with Dr. Barajas .  - TTE EF 65-70%, no RWMA  - Troponin trending down 0.23, 0.15, 0.14  - heparin gtt discontinued secondary to low hbg  - cont asa 81mg daily  - cont statin  - medical management at this time till anemia worked up, possible cardiac cath prior to discharge

## 2023-02-22 NOTE — PROGRESS NOTE ADULT - SUBJECTIVE AND OBJECTIVE BOX
[facility]                                                         CARDIOLOGY AT [facility]                                                                  [address], [place] [address]                                                         Telephone: [phone]. Fax:[phone]                                                                             PROGRESS NOTE    Reason for follow up: Weakness, elevated Troponin   Update: States feeling well. Denies chest pain, shortness of breath, dizziness.       Review of symptoms:   Cardiac:  No chest pain. No dyspnea. No palpitations.  Respiratory: no cough. No dyspnea  Gastrointestinal: No diarrhea. No abdominal pain. No bleeding.   Neuro: No focal neuro complaints.      Vitals:  T(C): 37.3 (02-22-23 @ 08:11), Max: 37.3 (02-22-23 @ 08:11)  HR: 80 (02-22-23 @ 08:11) (71 - 83)  BP: 102/66 (02-22-23 @ 08:11) (92/58 - 179/110)  RR: 19 (02-22-23 @ 08:11) (18 - 19)  SpO2: 98% (02-22-23 @ 08:11) (97% - 99%)      I&O's Summary    21 Feb 2023 07:01  -  22 Feb 2023 07:00  --------------------------------------------------------  IN: 474 mL / OUT: 400 mL / NET: 74 mL      Weight (kg): 46.3 (02-20 @ 17:20)      PHYSICAL EXAM:  Appearance: Comfortable. No acute distress. cachetic. Pale   HEENT:  Atraumatic. Normocephalic.  Normal oral mucosa  Neurologic: A & O x 3, no gross focal deficits.  Cardiovascular: RRR S1 S2, No murmur, no rubs/gallops. No JVD  Respiratory: Lungs clear to auscultation, unlabored   Gastrointestinal:  Soft, Non-tender, + BS  Lower Extremities: No edema  Psychiatry: Patient is calm. No agitation.   Skin: warm and dry.      CURRENT CARDIAC MEDICATIONS:  metoprolol tartrate 25 milliGRAM(s) Oral two times a day        CURRENT OTHER MEDICATIONS:  acetaminophen     Tablet .. 650 milliGRAM(s) Oral every 6 hours PRN Temp greater or equal to 38C (100.4F), Mild Pain (1 - 3), Moderate Pain (4 - 6)  atorvastatin 40 milliGRAM(s) Oral at bedtime  dextrose Oral Gel 15 Gram(s) Oral once, Stop order after: 1 Doses PRN Blood Glucose LESS THAN 70 milliGRAM(s)/deciliter  glucagon  Injectable 1 milliGRAM(s) IntraMuscular once, Stop order after: 1 Doses  insulin lispro (ADMELOG) corrective regimen sliding scale   SubCutaneous every 6 hours  aspirin enteric coated 81 milliGRAM(s) Oral daily  enoxaparin Injectable 40 milliGRAM(s) SubCutaneous every 24 hours  influenza   Vaccine 0.5 milliLiter(s) IntraMuscular once        LABS:	 	  ( 21 Feb 2023 06:53 )  Troponin T  0.14<H>,  CPK  35   , CKMB  X    , BNP X      ( 21 Feb 2023 00:36 )  Troponin T  0.15<H>,  CPK  40   , CKMB  X    , BNP X      ( 20 Feb 2023 16:54 )  Troponin T  0.23<H>,  CPK  X    , CKMB  X    , BNP X                              8.2    7.90  )-----------( 559      ( 22 Feb 2023 05:05 )             25.4     02-22    132<L>  |  93<L>  |  25.0<H>  ----------------------------<  153<H>  5.3   |  27.0  |  1.19    Ca    8.4      22 Feb 2023 05:05  Mg     1.7     02-21    TPro  8.1  /  Alb  3.2<L>  /  TBili  0.2<L>  /  DBili  x   /  AST  14  /  ALT  6   /  AlkPhos  162<H>  02-20    PT/INR/PTT ( 22 Feb 2023 01:50 )                       :                       :      X            :       42.3                  .        .                   .              .           .       X           .                                       Lipid Profile: Date: 02-21 @ 06:53  Total cholesterol 78; Direct LDL: --; HDL: 20; Triglycerides:143    TSH: Thyroid Stimulating Hormone, Serum: 1.45 uIU/mL      TELEMETRY: Sr, no alarms      DIAGNOSTIC TESTING:  [ ] Echocardiogram:   < from: TTE Echo Complete w/ Contrast w/ Doppler (02.21.23 @ 16:56) >  PHYSICIAN INTERPRETATION:  Left Ventricle: The left ventricular internal cavity size is normal. Left   ventricular wall thickness is normal.  Global LV systolic function was normal. Left ventricular ejection   fraction, by visual estimation, is 65 to 70%. Spectral Doppler shows   normal pattern of LV diastolic filling.  Right Ventricle: The right ventricular size is normal. RV systolic   function is normal.  Left Atrium: Normal left atrial size.  Right Atrium: Normal right atrial size.  Pericardium: There is no evidence of pericardial effusion.  Mitral Valve: The mitral valve is normal in structure. Mitral leaflet   mobility is normal. Mild mitral valve regurgitation is seen.  Tricuspid Valve: The tricuspid valve is normal in structure. Trivial   tricuspid regurgitation is visualized.  Aortic Valve: The aortic valve is trileaflet. No evidence of aortic   stenosis. No evidence of aortic valve regurgitation is seen.  Pulmonic Valve: The pulmonic valve is normal. Trace pulmonic valve   regurgitation.  Aorta: The aortic root is normal in size and structure.  Pulmonary Artery: The pulmonary artery is of normal size and origin.  Venous: The inferior vena cava was normal sized, with respiratory size   variation greater than 50%.      Summary:   1. Left ventricular ejection fraction, by visual estimation, is 65 to   70%.   2. Normal global left ventricular systolic function.   3. Normal left atrial size.   4. Normal right atrial size.   5. There is no evidence of pericardial effusion.   6. Mild mitral valve regurgitation.    MD Ren Electronically signed on 2/21/2023 at 6:52:13 PM      < end of copied text >    [ ]  Catheterization:  [ ] Stress Test:    OTHER: 	                                                                [facility]                                                         CARDIOLOGY AT [facility]                                                                  [address], [place] [address]                                                         Telephone: [phone]. Fax:[phone]                                                                             PROGRESS NOTE    Reason for follow up: Weakness, elevated Troponin   Update: States feeling well. Denies chest pain, shortness of breath, dizziness.       Review of symptoms:   Cardiac:  No chest pain. No dyspnea. No palpitations.  Respiratory: no cough. No dyspnea  Gastrointestinal: No diarrhea. No abdominal pain. No bleeding.   Neuro: No focal neuro complaints.      Vitals:  T(C): 37.3 (02-22-23 @ 08:11), Max: 37.3 (02-22-23 @ 08:11)  HR: 80 (02-22-23 @ 08:11) (71 - 83)  BP: 102/66 (02-22-23 @ 08:11) (92/58 - 179/110)  RR: 19 (02-22-23 @ 08:11) (18 - 19)  SpO2: 98% (02-22-23 @ 08:11) (97% - 99%)      I&O's Summary    21 Feb 2023 07:01  -  22 Feb 2023 07:00  --------------------------------------------------------  IN: 474 mL / OUT: 400 mL / NET: 74 mL      Weight (kg): 46.3 (02-20 @ 17:20)      PHYSICAL EXAM:  Appearance: Comfortable. No acute distress. cachetic. Pale   HEENT:  Atraumatic. Normocephalic.  Normal oral mucosa  Neurologic: A & O x 3, no gross focal deficits.  Cardiovascular: RRR S1 S2, No murmur, no rubs/gallops. No JVD  Respiratory: Lungs clear to auscultation, unlabored   Gastrointestinal:  Soft, Non-tender, + BS  Lower Extremities: No edema  Psychiatry: Patient is calm. No agitation.   Skin: warm and dry.      CURRENT CARDIAC MEDICATIONS:  metoprolol tartrate 25 milliGRAM(s) Oral two times a day        CURRENT OTHER MEDICATIONS:  acetaminophen     Tablet .. 650 milliGRAM(s) Oral every 6 hours PRN Temp greater or equal to 38C (100.4F), Mild Pain (1 - 3), Moderate Pain (4 - 6)  atorvastatin 40 milliGRAM(s) Oral at bedtime  dextrose Oral Gel 15 Gram(s) Oral once, Stop order after: 1 Doses PRN Blood Glucose LESS THAN 70 milliGRAM(s)/deciliter  glucagon  Injectable 1 milliGRAM(s) IntraMuscular once, Stop order after: 1 Doses  insulin lispro (ADMELOG) corrective regimen sliding scale   SubCutaneous every 6 hours  aspirin enteric coated 81 milliGRAM(s) Oral daily  enoxaparin Injectable 40 milliGRAM(s) SubCutaneous every 24 hours  influenza   Vaccine 0.5 milliLiter(s) IntraMuscular once        LABS:	 	  ( 21 Feb 2023 06:53 )  Troponin T  0.14<H>,  CPK  35   , CKMB  X    , BNP X      ( 21 Feb 2023 00:36 )  Troponin T  0.15<H>,  CPK  40   , CKMB  X    , BNP X      ( 20 Feb 2023 16:54 )  Troponin T  0.23<H>,  CPK  X    , CKMB  X    , BNP X                              8.2    7.90  )-----------( 559      ( 22 Feb 2023 05:05 )             25.4     02-22    132<L>  |  93<L>  |  25.0<H>  ----------------------------<  153<H>  5.3   |  27.0  |  1.19    Ca    8.4      22 Feb 2023 05:05  Mg     1.7     02-21    TPro  8.1  /  Alb  3.2<L>  /  TBili  0.2<L>  /  DBili  x   /  AST  14  /  ALT  6   /  AlkPhos  162<H>  02-20    PT/INR/PTT ( 22 Feb 2023 01:50 )                       :                       :      X            :       42.3                  .        .                   .              .           .       X           .                                       Lipid Profile: Date: 02-21 @ 06:53  Total cholesterol 78; Direct LDL: --; HDL: 20; Triglycerides:143    TSH: Thyroid Stimulating Hormone, Serum: 1.45 uIU/mL      TELEMETRY: Sr, no alarms  ECG 2/21 SR ST depression inferior anterior lateral leads    DIAGNOSTIC TESTING:  [ ] Echocardiogram:   < from: TTE Echo Complete w/ Contrast w/ Doppler (02.21.23 @ 16:56) >  PHYSICIAN INTERPRETATION:  Left Ventricle: The left ventricular internal cavity size is normal. Left   ventricular wall thickness is normal.  Global LV systolic function was normal. Left ventricular ejection   fraction, by visual estimation, is 65 to 70%. Spectral Doppler shows   normal pattern of LV diastolic filling.  Right Ventricle: The right ventricular size is normal. RV systolic   function is normal.  Left Atrium: Normal left atrial size.  Right Atrium: Normal right atrial size.  Pericardium: There is no evidence of pericardial effusion.  Mitral Valve: The mitral valve is normal in structure. Mitral leaflet   mobility is normal. Mild mitral valve regurgitation is seen.  Tricuspid Valve: The tricuspid valve is normal in structure. Trivial   tricuspid regurgitation is visualized.  Aortic Valve: The aortic valve is trileaflet. No evidence of aortic   stenosis. No evidence of aortic valve regurgitation is seen.  Pulmonic Valve: The pulmonic valve is normal. Trace pulmonic valve   regurgitation.  Aorta: The aortic root is normal in size and structure.  Pulmonary Artery: The pulmonary artery is of normal size and origin.  Venous: The inferior vena cava was normal sized, with respiratory size   variation greater than 50%.      Summary:   1. Left ventricular ejection fraction, by visual estimation, is 65 to   70%.   2. Normal global left ventricular systolic function.   3. Normal left atrial size.   4. Normal right atrial size.   5. There is no evidence of pericardial effusion.   6. Mild mitral valve regurgitation.    MD Ren Electronically signed on 2/21/2023 at 6:52:13 PM      < end of copied text >

## 2023-02-22 NOTE — PROGRESS NOTE ADULT - ASSESSMENT
46yo M w/ T2DM arrived by ambulance after experincing weakness and near syncope without chest pain or shortness of breath.  ECG no acute changes  Trop: 0.23 c/w NSTEMi

## 2023-02-23 ENCOUNTER — TRANSCRIPTION ENCOUNTER (OUTPATIENT)
Age: 48
End: 2023-02-23

## 2023-02-23 LAB
ALBUMIN SERPL ELPH-MCNC: 2.6 G/DL — LOW (ref 3.3–5.2)
ALBUMIN SERPL ELPH-MCNC: 2.8 G/DL — LOW (ref 3.3–5.2)
ALP SERPL-CCNC: 137 U/L — HIGH (ref 40–120)
ALP SERPL-CCNC: 150 U/L — HIGH (ref 40–120)
ALT FLD-CCNC: <5 U/L — SIGNIFICANT CHANGE UP
ALT FLD-CCNC: <5 U/L — SIGNIFICANT CHANGE UP
ANION GAP SERPL CALC-SCNC: 10 MMOL/L — SIGNIFICANT CHANGE UP (ref 5–17)
ANION GAP SERPL CALC-SCNC: 11 MMOL/L — SIGNIFICANT CHANGE UP (ref 5–17)
APTT BLD: 41 SEC — HIGH (ref 27.5–35.5)
AST SERPL-CCNC: 7 U/L — SIGNIFICANT CHANGE UP
AST SERPL-CCNC: 8 U/L — SIGNIFICANT CHANGE UP
BASOPHILS # BLD AUTO: 0.02 K/UL — SIGNIFICANT CHANGE UP (ref 0–0.2)
BASOPHILS # BLD AUTO: 0.02 K/UL — SIGNIFICANT CHANGE UP (ref 0–0.2)
BASOPHILS NFR BLD AUTO: 0.2 % — SIGNIFICANT CHANGE UP (ref 0–2)
BASOPHILS NFR BLD AUTO: 0.3 % — SIGNIFICANT CHANGE UP (ref 0–2)
BILIRUB SERPL-MCNC: <0.2 MG/DL — LOW (ref 0.4–2)
BILIRUB SERPL-MCNC: <0.2 MG/DL — LOW (ref 0.4–2)
BUN SERPL-MCNC: 22.5 MG/DL — HIGH (ref 8–20)
BUN SERPL-MCNC: 24.9 MG/DL — HIGH (ref 8–20)
CALCIUM SERPL-MCNC: 8.6 MG/DL — SIGNIFICANT CHANGE UP (ref 8.4–10.5)
CALCIUM SERPL-MCNC: 8.8 MG/DL — SIGNIFICANT CHANGE UP (ref 8.4–10.5)
CHLORIDE SERPL-SCNC: 92 MMOL/L — LOW (ref 96–108)
CHLORIDE SERPL-SCNC: 93 MMOL/L — LOW (ref 96–108)
CK SERPL-CCNC: 46 U/L — SIGNIFICANT CHANGE UP (ref 30–200)
CO2 SERPL-SCNC: 26 MMOL/L — SIGNIFICANT CHANGE UP (ref 22–29)
CO2 SERPL-SCNC: 28 MMOL/L — SIGNIFICANT CHANGE UP (ref 22–29)
CREAT SERPL-MCNC: 1.08 MG/DL — SIGNIFICANT CHANGE UP (ref 0.5–1.3)
CREAT SERPL-MCNC: 1.09 MG/DL — SIGNIFICANT CHANGE UP (ref 0.5–1.3)
EGFR: 84 ML/MIN/1.73M2 — SIGNIFICANT CHANGE UP
EGFR: 85 ML/MIN/1.73M2 — SIGNIFICANT CHANGE UP
EOSINOPHIL # BLD AUTO: 0.06 K/UL — SIGNIFICANT CHANGE UP (ref 0–0.5)
EOSINOPHIL # BLD AUTO: 0.06 K/UL — SIGNIFICANT CHANGE UP (ref 0–0.5)
EOSINOPHIL NFR BLD AUTO: 0.7 % — SIGNIFICANT CHANGE UP (ref 0–6)
EOSINOPHIL NFR BLD AUTO: 0.8 % — SIGNIFICANT CHANGE UP (ref 0–6)
FOLATE SERPL-MCNC: 12.1 NG/ML — SIGNIFICANT CHANGE UP
GLUCOSE BLDC GLUCOMTR-MCNC: 179 MG/DL — HIGH (ref 70–99)
GLUCOSE BLDC GLUCOMTR-MCNC: 203 MG/DL — HIGH (ref 70–99)
GLUCOSE BLDC GLUCOMTR-MCNC: 206 MG/DL — HIGH (ref 70–99)
GLUCOSE BLDC GLUCOMTR-MCNC: 240 MG/DL — HIGH (ref 70–99)
GLUCOSE BLDC GLUCOMTR-MCNC: 286 MG/DL — HIGH (ref 70–99)
GLUCOSE SERPL-MCNC: 206 MG/DL — HIGH (ref 70–99)
GLUCOSE SERPL-MCNC: 228 MG/DL — HIGH (ref 70–99)
HCT VFR BLD CALC: 26.7 % — LOW (ref 39–50)
HCT VFR BLD CALC: 27.1 % — LOW (ref 39–50)
HGB BLD-MCNC: 8.7 G/DL — LOW (ref 13–17)
HGB BLD-MCNC: 8.9 G/DL — LOW (ref 13–17)
IMM GRANULOCYTES NFR BLD AUTO: 0.1 % — SIGNIFICANT CHANGE UP (ref 0–0.9)
IMM GRANULOCYTES NFR BLD AUTO: 0.2 % — SIGNIFICANT CHANGE UP (ref 0–0.9)
INR BLD: 1.26 RATIO — HIGH (ref 0.88–1.16)
LYMPHOCYTES # BLD AUTO: 1.75 K/UL — SIGNIFICANT CHANGE UP (ref 1–3.3)
LYMPHOCYTES # BLD AUTO: 1.8 K/UL — SIGNIFICANT CHANGE UP (ref 1–3.3)
LYMPHOCYTES # BLD AUTO: 20.6 % — SIGNIFICANT CHANGE UP (ref 13–44)
LYMPHOCYTES # BLD AUTO: 25 % — SIGNIFICANT CHANGE UP (ref 13–44)
MAGNESIUM SERPL-MCNC: 1.8 MG/DL — SIGNIFICANT CHANGE UP (ref 1.6–2.6)
MCHC RBC-ENTMCNC: 27.1 PG — SIGNIFICANT CHANGE UP (ref 27–34)
MCHC RBC-ENTMCNC: 27.1 PG — SIGNIFICANT CHANGE UP (ref 27–34)
MCHC RBC-ENTMCNC: 32.6 GM/DL — SIGNIFICANT CHANGE UP (ref 32–36)
MCHC RBC-ENTMCNC: 32.8 GM/DL — SIGNIFICANT CHANGE UP (ref 32–36)
MCV RBC AUTO: 82.6 FL — SIGNIFICANT CHANGE UP (ref 80–100)
MCV RBC AUTO: 83.2 FL — SIGNIFICANT CHANGE UP (ref 80–100)
MONOCYTES # BLD AUTO: 0.56 K/UL — SIGNIFICANT CHANGE UP (ref 0–0.9)
MONOCYTES # BLD AUTO: 0.6 K/UL — SIGNIFICANT CHANGE UP (ref 0–0.9)
MONOCYTES NFR BLD AUTO: 6.6 % — SIGNIFICANT CHANGE UP (ref 2–14)
MONOCYTES NFR BLD AUTO: 8.3 % — SIGNIFICANT CHANGE UP (ref 2–14)
NEUTROPHILS # BLD AUTO: 4.72 K/UL — SIGNIFICANT CHANGE UP (ref 1.8–7.4)
NEUTROPHILS # BLD AUTO: 6.09 K/UL — SIGNIFICANT CHANGE UP (ref 1.8–7.4)
NEUTROPHILS NFR BLD AUTO: 65.5 % — SIGNIFICANT CHANGE UP (ref 43–77)
NEUTROPHILS NFR BLD AUTO: 71.7 % — SIGNIFICANT CHANGE UP (ref 43–77)
PHOSPHATE SERPL-MCNC: 2.6 MG/DL — SIGNIFICANT CHANGE UP (ref 2.4–4.7)
PLATELET # BLD AUTO: 522 K/UL — HIGH (ref 150–400)
PLATELET # BLD AUTO: 549 K/UL — HIGH (ref 150–400)
POTASSIUM SERPL-MCNC: 4.8 MMOL/L — SIGNIFICANT CHANGE UP (ref 3.5–5.3)
POTASSIUM SERPL-MCNC: 4.9 MMOL/L — SIGNIFICANT CHANGE UP (ref 3.5–5.3)
POTASSIUM SERPL-SCNC: 4.8 MMOL/L — SIGNIFICANT CHANGE UP (ref 3.5–5.3)
POTASSIUM SERPL-SCNC: 4.9 MMOL/L — SIGNIFICANT CHANGE UP (ref 3.5–5.3)
PROT SERPL-MCNC: 7.2 G/DL — SIGNIFICANT CHANGE UP (ref 6.6–8.7)
PROT SERPL-MCNC: 7.6 G/DL — SIGNIFICANT CHANGE UP (ref 6.6–8.7)
PROTHROM AB SERPL-ACNC: 14.6 SEC — HIGH (ref 10.5–13.4)
RBC # BLD: 3.21 M/UL — LOW (ref 4.2–5.8)
RBC # BLD: 3.28 M/UL — LOW (ref 4.2–5.8)
RBC # FLD: 14.5 % — SIGNIFICANT CHANGE UP (ref 10.3–14.5)
RBC # FLD: 14.5 % — SIGNIFICANT CHANGE UP (ref 10.3–14.5)
SODIUM SERPL-SCNC: 128 MMOL/L — LOW (ref 135–145)
SODIUM SERPL-SCNC: 132 MMOL/L — LOW (ref 135–145)
TROPONIN T SERPL-MCNC: 0.17 NG/ML — HIGH (ref 0–0.06)
VIT B12 SERPL-MCNC: 866 PG/ML — SIGNIFICANT CHANGE UP (ref 232–1245)
WBC # BLD: 7.21 K/UL — SIGNIFICANT CHANGE UP (ref 3.8–10.5)
WBC # BLD: 8.5 K/UL — SIGNIFICANT CHANGE UP (ref 3.8–10.5)
WBC # FLD AUTO: 7.21 K/UL — SIGNIFICANT CHANGE UP (ref 3.8–10.5)
WBC # FLD AUTO: 8.5 K/UL — SIGNIFICANT CHANGE UP (ref 3.8–10.5)

## 2023-02-23 PROCEDURE — 99223 1ST HOSP IP/OBS HIGH 75: CPT

## 2023-02-23 PROCEDURE — 70498 CT ANGIOGRAPHY NECK: CPT | Mod: 26

## 2023-02-23 PROCEDURE — 0042T: CPT

## 2023-02-23 PROCEDURE — 93010 ELECTROCARDIOGRAM REPORT: CPT

## 2023-02-23 PROCEDURE — 99232 SBSQ HOSP IP/OBS MODERATE 35: CPT

## 2023-02-23 PROCEDURE — 70496 CT ANGIOGRAPHY HEAD: CPT | Mod: 26

## 2023-02-23 PROCEDURE — 99233 SBSQ HOSP IP/OBS HIGH 50: CPT

## 2023-02-23 PROCEDURE — 71045 X-RAY EXAM CHEST 1 VIEW: CPT | Mod: 26

## 2023-02-23 RX ORDER — SODIUM CHLORIDE 9 MG/ML
1000 INJECTION INTRAMUSCULAR; INTRAVENOUS; SUBCUTANEOUS
Refills: 0 | Status: COMPLETED | OUTPATIENT
Start: 2023-02-23 | End: 2023-02-23

## 2023-02-23 RX ADMIN — ATORVASTATIN CALCIUM 40 MILLIGRAM(S): 80 TABLET, FILM COATED ORAL at 21:30

## 2023-02-23 RX ADMIN — Medication 25 MILLIGRAM(S): at 18:37

## 2023-02-23 RX ADMIN — ENOXAPARIN SODIUM 40 MILLIGRAM(S): 100 INJECTION SUBCUTANEOUS at 08:01

## 2023-02-23 RX ADMIN — Medication 1: at 18:36

## 2023-02-23 RX ADMIN — SODIUM CHLORIDE 75 MILLILITER(S): 9 INJECTION INTRAMUSCULAR; INTRAVENOUS; SUBCUTANEOUS at 21:30

## 2023-02-23 RX ADMIN — Medication 2: at 12:40

## 2023-02-23 RX ADMIN — Medication 25 MILLIGRAM(S): at 05:22

## 2023-02-23 RX ADMIN — Medication 81 MILLIGRAM(S): at 08:01

## 2023-02-23 RX ADMIN — Medication 2: at 08:05

## 2023-02-23 RX ADMIN — SODIUM CHLORIDE 75 MILLILITER(S): 9 INJECTION INTRAMUSCULAR; INTRAVENOUS; SUBCUTANEOUS at 10:46

## 2023-02-23 NOTE — PROGRESS NOTE ADULT - ASSESSMENT
46 y/o Kenyan speaking M w/ PMH of IDDM, chronic anemia (baseline Hb unknown), was sent from cardiology office for near syncopal event w/ episodes of hypotension while in ED upon standing and with labs notable for elevated troponins w/ EKG changes admitted for NSTEMI.  Trops have since trended down.  LHC postponed and heparin gtt d/c' in light of anemia.  Code stroke called today due to acute onset Rt facial droop, rt sided weakness and slurred speech.  Pt upgraded for q2h VS and neurochecks per neuro recs.       NSTEMI  Near syncope   - No CP at this time and tele w/out events   - Possibly demand ischemia from syncopal/hypotensive episodes  - Troponins down trending (0.23-->0.14)  - EKG showing new TWI in II, III, V4-V6 on 3rd EKG  - d/c'd heparin gtt in light of H/H trending down but will c/w ASA   - c/w statin therapy   - s/p 3L of LR on admission   - Continue monitoring on telemetry monitoring  - Cardiology following and recs noted       AMS w/ acute onset Rt sided deficits  - Code stroke called   - Symptoms now near resolution   - CTH (-) for acute findings including bleed   - CTA head/neck pending and may need MRI brain   - VS and blood glucose during event nL   - Unlikely vagal near syncope given neurologic deficits noted during event   - Will upgrade to SDU per neuro recs for q2h VS and neurochecks      Hypovolemic Hyponatremic hypochloremia   - suspect due to poor po intake / dehydration    - Improving after IVFs; will c/s gentle hydration   - Encourage po intake   - Monitor BMP      Normocytic Anemia  - Pt reports hx of anemia requiring blood transfusions in the past  - Does not know baseline hb or why he has anemia   - Clinically malnutrition which can contribute   - Slight trend down since admission likely dilutional given 3Ls IVF but now stable overall    - Iron panel noted and started on short course of venofer then transition to po   - B12/folate levels nL   - No reports of melena or hematochezia but elevated BUN/Cr ratio could be from dehydration but FOBT ordered    - Monitor CBC and transfuse for Hb<8       IDDM  - A1c 8.3  - BG at goal of <180 but if hyperglycemia consider basal/bolus insulin regimen   - ISS and hypoglycemic protocol in place       Severe protein calorie malnutrition   - Pt cachectic   - Has poor po intake   - Unclear if has underlying malignancy but suspect given report of rapid unintentional weight loss   - Will try and obtain collateral information from [facility]   - Will consult nutrition          VTE ppx: lovenox sq     Dispo: pt remains acute requiring inpt hospitalization.  Pending C once medically stable.

## 2023-02-23 NOTE — PROGRESS NOTE ADULT - SUBJECTIVE AND OBJECTIVE BOX
Chief Complaint:  nstemi    SUBJECTIVE / OVERNIGHT EVENTS:  No acute events reported overnight.  This morning code stroke called on pt for weakness, change in mental status, rt facial droop, Rt sided weakness, slurring of speech.  symptoms started resolving within minutes.  Pt now near baseline and only reporting generalized weakness that was present on admission.  He denies cp, palpitations, sob, abd pain, N/V.          I&O's Summary    21 Feb 2023 07:01  -  22 Feb 2023 07:00  --------------------------------------------------------  IN: 474 mL / OUT: 400 mL / NET: 74 mL        PHYSICAL EXAM:  Vital Signs Last 24 Hrs  T(C): 36.7 (23 Feb 2023 08:36), Max: 37 (22 Feb 2023 17:09)  T(F): 98.1 (23 Feb 2023 08:36), Max: 98.6 (22 Feb 2023 17:09)  HR: 75 (23 Feb 2023 08:36) (75 - 96)  BP: 120/82 (23 Feb 2023 08:36) (117/79 - 143/93)  BP(mean): --  RR: 19 (23 Feb 2023 08:36) (18 - 19)  SpO2: 100% (23 Feb 2023 04:08) (100% - 100%)        GENERAL: cachectic M examined bedside, laying comfortably in bed in NAD  HEENT: NC/AT, moist oral mucosa, +Rt eye cataract, pale conjunctiva, sclera nonicteric  RESPIRATORY: Normal respiratory effort, no wheezing, rhonchi, rales  CARDIOVASCULAR: RRR, normal S1 and S2  ABDOMEN: soft, NT/ND, +bowel sounds, no rebound/guarding  EXTREMITIES: No cynaosis, no clubbing, no lower extremity edema  PSYCH: affect flat but cooperative  NEUROLOGY: A+O x3, Rt facial droop, Rt hemiparesis resolved, slurring of speech resolved   SKIN: pallor         LABS:                                      8.9    8.50  )-----------( 549      ( 23 Feb 2023 09:20 )             27.1       02-23    128<L>  |  92<L>  |  24.9<H>  ----------------------------<  228<H>  4.8   |  26.0  |  1.08    Ca    8.8      23 Feb 2023 09:20  Phos  2.6     02-23  Mg     1.8     02-23    TPro  7.6  /  Alb  2.8<L>  /  TBili  <0.2<L>  /  DBili  x   /  AST  7   /  ALT  <5  /  AlkPhos  150<H>  02-23       PTT - ( 22 Feb 2023 08:33 )  PTT:33.7 sec  CARDIAC MARKERS ( 21 Feb 2023 06:53 )  x     / 0.14 ng/mL / 35 U/L / x     / x      CARDIAC MARKERS ( 21 Feb 2023 00:36 )  x     / 0.15 ng/mL / 40 U/L / x     / x      CARDIAC MARKERS ( 20 Feb 2023 16:54 )  x     / 0.23 ng/mL / x     / x     / x              Culture - Blood (collected 20 Feb 2023 16:55)  Source: .Blood Blood-Peripheral  Preliminary Report (21 Feb 2023 22:02):    No growth to date.    Culture - Blood (collected 20 Feb 2023 16:45)  Source: .Blood Blood-Peripheral  Preliminary Report (21 Feb 2023 22:02):    No growth to date.      CAPILLARY BLOOD GLUCOSE      POCT Blood Glucose.: 179 mg/dL (22 Feb 2023 11:58)  POCT Blood Glucose.: 168 mg/dL (22 Feb 2023 07:35)  POCT Blood Glucose.: 164 mg/dL (22 Feb 2023 05:33)  POCT Blood Glucose.: 274 mg/dL (21 Feb 2023 23:35)  POCT Blood Glucose.: 186 mg/dL (21 Feb 2023 21:27)  POCT Blood Glucose.: 160 mg/dL (21 Feb 2023 18:09)        RADIOLOGY & ADDITIONAL TESTS:    < from: Xray Chest 1 View- PORTABLE-Urgent (02.20.23 @ 18:31) >  IMPRESSION: Negative chest.    < end of copied text >      < from: TTE Echo Complete w/ Contrast w/ Doppler (02.21.23 @ 16:56) >    Summary:   1. Left ventricular ejection fraction, by visual estimation, is 65 to   70%.   2. Normal global left ventricular systolic function.   3. Normal left atrial size.   4. Normal right atrial size.   5. There is no evidence of pericardial effusion.   6. Mild mitral valve regurgitation.    < end of copied text >      MEDICATIONS  (STANDING):  aspirin enteric coated 81 milliGRAM(s) Oral daily  atorvastatin 40 milliGRAM(s) Oral at bedtime  dextrose 5%. 1000 milliLiter(s) (100 mL/Hr) IV Continuous <Continuous>  dextrose 5%. 1000 milliLiter(s) (50 mL/Hr) IV Continuous <Continuous>  dextrose 50% Injectable 25 Gram(s) IV Push once  dextrose 50% Injectable 12.5 Gram(s) IV Push once  dextrose 50% Injectable 25 Gram(s) IV Push once  enoxaparin Injectable 40 milliGRAM(s) SubCutaneous every 24 hours  glucagon  Injectable 1 milliGRAM(s) IntraMuscular once  influenza   Vaccine 0.5 milliLiter(s) IntraMuscular once  insulin lispro (ADMELOG) corrective regimen sliding scale   SubCutaneous three times a day before meals  metoprolol tartrate 25 milliGRAM(s) Oral two times a day    MEDICATIONS  (PRN):  acetaminophen     Tablet .. 650 milliGRAM(s) Oral every 6 hours PRN Temp greater or equal to 38C (100.4F), Mild Pain (1 - 3), Moderate Pain (4 - 6)  dextrose Oral Gel 15 Gram(s) Oral once PRN Blood Glucose LESS THAN 70 milliGRAM(s)/deciliter

## 2023-02-23 NOTE — STROKE CODE NOTE - NIH STROKE SCALE: 6A. MOTOR LEG, LEFT, QM
Addended by: KALLI ESCAMILLA on: 2017 08:57 AM     Modules accepted: Orders     (0) No drift; leg holds 30 degree position for full 5 secs

## 2023-02-23 NOTE — PROGRESS NOTE ADULT - ASSESSMENT
A/P: 46yo M w/ T2DM arrived by ambulance after experincing weakness and near syncope without chest pain or shortness of breath.  ECG no acute changes  Trop: 0.23 c/w NSTEMi

## 2023-02-23 NOTE — CONSULT NOTE ADULT - ASSESSMENT
ASSESSMENT:     NEURO: Continue close monitoring for neurologic deterioration, with neuro checks Q 2hours, permissive HTN -140 mmGh, titrate statin to LDL goal less than 70, MRI Brain w/o, MRA Head w/o and Neck w/contrast. Physical therapy/OT/Speech eval/treatment.     ANTITHROMBOTIC THERAPY: ASA 81 mg daily    PULMONARY: CXR, protecting airway, saturating well on room air    CARDIOVASCULAR: check TTE, cardiac monitoring                              GASTROINTESTINAL:  dysphagia screen       Diet: NPO    RENAL: BUN/Cr 24.9/1.08, monitor urine output, keep hydrated as tolerated      Na Goal:  135-145     Hernandez: N    HEMATOLOGY: H/H 8.9/27.1, Platelets 549      DVT ppx: Heparin s.c [] LMWH [x]     ID: afebrile, no leukocytosis, monitor for signs of infection    DISPOSITION: Rehab or home depending on PT eval once stable and workup is complete      CORE MEASURES:        Admission NIHSS: 4     TPA: [] YES [x] NO      LDL/HDL/A1C: 29/58/8.3     Depression Screen: pending     Statin Therapy: Atorvastatin 40 mg daily     Dysphagia Screen: [] PASS [] FAIL     Smoking [] YES [] NO      Afib [] YES [] NO     Stroke Education [x] YES [] NO     ASSESSMENT:  47yoM hx DM, on insulin, who was sent from an outpatient clinical for near syncopal event. While in ED, pt also with 2 episodes of hypotension that occurred upon standing. Pt. was admitted for evaluation and etiology of syncopal episodes. Code stroke called 0907 AM 2/23/23 NIH 4 with right facial droop and right side weakness. Stroke team assessment at 0925 NIH 2 with left  facial palsy. CTA without evidence of an acute transcortical infarction or hemorrhage.     NEURO: Continue close monitoring for neurologic deterioration, with neuro checks Q 2hours, permissive HTN -140 mmHg , check BP for orthostatic hypotension. CTA without evidence of infarct or hemorrhage. MRI Brain w/o, MRA Head w/o and Neck w/contrast. Titrate statin to LDL goal less than 70, Physical therapy/OT/Speech eval/treatment.     ANTITHROMBOTIC THERAPY: ASA 81 mg daily    PULMONARY: CXR, protecting airway, saturating well on room air    CARDIOVASCULAR: check TTE, cardiac monitoring                              GASTROINTESTINAL:  dysphagia screen  passed. Nutritional consult     Diet: DASH diet as tolerated,     RENAL: BUN/Cr 24.9/1.08, monitor urine output, keep hydrated as tolerated      Na Goal:  135-145     Hernandez: N    HEMATOLOGY: H/H 8.9/27.1, Platelets 549, all age appropriate and malignancy screening to determine resent unintentional weight loss (20 lb in 2 month)     DVT ppx: Heparin s.c [] LMWH [x]     ID: afebrile, no leukocytosis, monitor for signs of infection    OTHER: SW and nutritionist consult    DISPOSITION: Rehab or home depending on PT eval once stable and workup is complete      CORE MEASURES:        Admission NIHSS: 4     TPA: [] YES [x] NO      LDL/HDL/A1C: 29/58/8.3     Depression Screen: pending     Statin Therapy: Atorvastatin 40 mg daily     Dysphagia Screen: [x] PASS [] FAIL     Smoking [] YES [x] NO      Afib [] YES [] NO     Stroke Education [x] YES [] NO     ASSESSMENT:  47yoM hx DM, on insulin, who was sent from an outpatient clinical for near syncopal event. While in ED, pt also with 2 episodes of hypotension that occurred upon standing. Pt. was admitted for evaluation and etiology of syncopal episodes. Code stroke called 0907 AM 2/23/23 NIH 4 with right facial droop and right side weakness. Stroke team assessment at 0925 NIH 2 with left  facial palsy. CTA without evidence of an acute transcortical infarction or hemorrhage. No large vessel occlusion.      NEURO: Continue close monitoring for neurologic deterioration, with neuro checks Q 2hours, permissive HTN -140 mmHg , check BP for orthostatic hypotension. CTA without evidence of infarct, hemorrhage, or a large vessel occlusion.  MRI Brain w/o, MRA Head w/o and Neck w/contrast. Titrate statin to LDL goal less than 70, Physical therapy/OT/Speech eval/treatment.     ANTITHROMBOTIC THERAPY: ASA 81 mg daily     PULMONARY: CXR, protecting airway, saturating well on room air    CARDIOVASCULAR: check TTE, cardiac monitoring, cardiology consult                               GASTROINTESTINAL:  dysphagia screen  passed. Nutritional consult     Diet: DASH diet as tolerated,     RENAL: BUN/Cr 24.9/1.08, monitor urine output, keep hydrated as tolerated      Na Goal:  135-145     Hernandez: N    HEMATOLOGY: H/H 8.9/27.1, Platelets 549, all age appropriate and malignancy screening to determine resent unintentional weight loss (20 lb in 2 month)     DVT ppx: Heparin s.c [] LMWH [x]     ID: afebrile, no leukocytosis, monitor for signs of infection    OTHER: SW and nutritionist consult    DISPOSITION: Rehab or home depending on PT eval once stable and workup is complete      CORE MEASURES:        Admission NIHSS: 4     TPA: [] YES [x] NO      LDL/HDL/A1C: 29/58/8.3     Depression Screen: pending     Statin Therapy: Atorvastatin 40 mg daily     Dysphagia Screen: [x] PASS [] FAIL     Smoking [] YES [x] NO      Afib [] YES [] NO     Stroke Education [x] YES [] NO

## 2023-02-23 NOTE — PROGRESS NOTE ADULT - NS ATTEND AMEND GEN_ALL_CORE FT
Non-ST elevation (NSTEMI) myocardial infarction.   Elevated troponin with normal CKs.  Echocardiogram with normal EF and no RWMA.   Plan for LHC vs. NST, but due to development of new Neurological symptoms concerning for CVA, would recommend NST.   Plan for NST after Neurological workup and MRI are completed.   Continue aspirin, metoprolol, and lipitor.

## 2023-02-23 NOTE — DISCHARGE NOTE NURSING/CASE MANAGEMENT/SOCIAL WORK - NSDCPEFALRISK_GEN_ALL_CORE
For information on Fall & Injury Prevention, visit: [email or web address] OR  [email or web address] OR  https://www.cdc.gov/steadi/patient.html

## 2023-02-23 NOTE — PROGRESS NOTE ADULT - SUBJECTIVE AND OBJECTIVE BOX
[facility]                                                         CARDIOLOGY AT [facility]                                                                  [address], [place] [address]                                                         Telephone: [phone]. Fax:[phone]                                                                             PROGRESS NOTE    Reason for follow up: NSTEMI  Update: Spoke with patient via [facility] ID [id number]. Patient with stroke code today for acute episode of weakness, AMS, right facial droop, right sided weakness, and slurred speech. Symptoms resolved within minutes, and CT head with no acute findings. Patient still feeling generalized weakness at this time. MRI brain pending.     Review of symptoms:   Cardiac:  No chest pain. No dyspnea. No palpitations.  Respiratory: no cough. No dyspnea  Gastrointestinal: No diarrhea. No abdominal pain. No bleeding.   Neuro: No focal neuro complaints.    Vitals:  T(C): 36.4 (02-23-23 @ 11:25), Max: 37 (02-22-23 @ 17:09)  HR: 75 (02-23-23 @ 11:25) (73 - 96)  BP: 128/94 (02-23-23 @ 11:48) (94/59 - 143/93)  RR: 18 (02-23-23 @ 11:25) (17 - 19)  SpO2: 99% (02-23-23 @ 11:25) (99% - 100%)  Wt(kg): --  I&O's Summary    22 Feb 2023 07:01  -  23 Feb 2023 07:00  --------------------------------------------------------  IN: 0 mL / OUT: 900 mL / NET: -900 mL      Weight (kg): 46.3 (02-20 @ 17:20)    PHYSICAL EXAM:  Appearance: Comfortable. No acute distress  HEENT:  Atraumatic. Normocephalic.  Normal oral mucosa  Neurologic: A & O x 3, mild right facial droop  Cardiovascular: RRR S1 S2, No murmur, no rubs/gallops. No JVD  Respiratory: Lungs clear to auscultation, unlabored   Gastrointestinal:  Soft, Non-tender, + BS  Lower Extremities: 2+ Peripheral Pulses, No clubbing, cyanosis, or edema  Psychiatry: Patient is calm. No agitation.   Skin: warm and dry.    CURRENT CARDIAC MEDICATIONS:  metoprolol tartrate 25 milliGRAM(s) Oral two times a day      CURRENT OTHER MEDICATIONS:  acetaminophen     Tablet .. 650 milliGRAM(s) Oral every 6 hours PRN Temp greater or equal to 38C (100.4F), Mild Pain (1 - 3), Moderate Pain (4 - 6)  atorvastatin 40 milliGRAM(s) Oral at bedtime  dextrose 50% Injectable 25 Gram(s) IV Push once, Stop order after: 1 Doses  dextrose 50% Injectable 12.5 Gram(s) IV Push once, Stop order after: 1 Doses  dextrose 50% Injectable 25 Gram(s) IV Push once, Stop order after: 1 Doses  dextrose Oral Gel 15 Gram(s) Oral once, Stop order after: 1 Doses PRN Blood Glucose LESS THAN 70 milliGRAM(s)/deciliter  glucagon  Injectable 1 milliGRAM(s) IntraMuscular once, Stop order after: 1 Doses  insulin lispro (ADMELOG) corrective regimen sliding scale   SubCutaneous three times a day before meals  aspirin enteric coated 81 milliGRAM(s) Oral daily  dextrose 5%. 1000 milliLiter(s) (100 mL/Hr) IV Continuous <Continuous>  dextrose 5%. 1000 milliLiter(s) (50 mL/Hr) IV Continuous <Continuous>  enoxaparin Injectable 40 milliGRAM(s) SubCutaneous every 24 hours  influenza   Vaccine 0.5 milliLiter(s) IntraMuscular once  sodium chloride 0.9%. 1000 milliLiter(s) (75 mL/Hr) IV Continuous <Continuous>, Stop order after: 2 Doses      LABS:	 	  ( 23 Feb 2023 09:20 )  Troponin T  0.17<H>,  CPK  46   , CKMB  X    , BNP X        , ( 21 Feb 2023 06:53 )  Troponin T  0.14<H>,  CPK  35   , CKMB  X    , BNP X        , ( 21 Feb 2023 00:36 )  Troponin T  0.15<H>,  CPK  40   , CKMB  X    , BNP X                                  8.9    8.50  )-----------( 549      ( 23 Feb 2023 09:20 )             27.1     02-23    128<L>  |  92<L>  |  24.9<H>  ----------------------------<  228<H>  4.8   |  26.0  |  1.08    Ca    8.8      23 Feb 2023 09:20  Phos  2.6     02-23  Mg     1.8     02-23    TPro  7.6  /  Alb  2.8<L>  /  TBili  <0.2<L>  /  DBili  x   /  AST  7   /  ALT  <5  /  AlkPhos  150<H>  02-23    PT/INR/PTT ( 23 Feb 2023 10:30 )                       :                       :      14.6         :       41.0                  .        .                   .              .           .       1.26        .                                       Lipid Profile: Date: 02-21 @ 06:53  Total cholesterol 78; Direct LDL: --; HDL: 20; Triglycerides:143    HgA1c:   TSH: Thyroid Stimulating Hormone, Serum: 1.45 uIU/mL      TELEMETRY: SR  ECG:    DIAGNOSTIC TESTING:  [ ] Echocardiogram:   < from: TTE Echo Complete w/ Contrast w/ Doppler (02.21.23 @ 16:56) >    PHYSICIAN INTERPRETATION:  Left Ventricle: The left ventricular internal cavity size is normal. Left   ventricular wall thickness is normal.  Global LV systolic function was normal. Left ventricular ejection   fraction, by visual estimation, is 65 to 70%. Spectral Doppler shows   normal pattern of LV diastolic filling.  Right Ventricle: The right ventricular size is normal. RV systolic   function is normal.  Left Atrium: Normal left atrial size.  Right Atrium: Normal right atrial size.  Pericardium: There is no evidence of pericardial effusion.  Mitral Valve: The mitral valve is normal in structure. Mitral leaflet   mobility is normal. Mild mitral valve regurgitation is seen.  Tricuspid Valve: The tricuspid valve is normal in structure. Trivial   tricuspid regurgitation is visualized.  Aortic Valve: The aortic valve is trileaflet. No evidence of aortic   stenosis. No evidence of aortic valve regurgitation is seen.  Pulmonic Valve: The pulmonic valve is normal. Trace pulmonic valve   regurgitation.  Aorta: The aortic root is normal in size and structure.  Pulmonary Artery: The pulmonary artery is of normal size and origin.  Venous: The inferior vena cava was normal sized, with respiratory size   variation greater than 50%.      Summary:   1. Left ventricular ejection fraction, by visual estimation, is 65 to   70%.   2. Normal global left ventricular systolic function.   3. Normal left atrial size.   4. Normal right atrial size.   5. There is no evidence of pericardial effusion.   6. Mild mitral valve regurgitation.    MD Ren Electronically signed on 2/21/2023 at 6:52:13 PM      < end of copied text >    [ ]  Catheterization:  [ ] Stress Test:    OTHER:

## 2023-02-23 NOTE — PROGRESS NOTE ADULT - PROBLEM SELECTOR PLAN 1
- Elevated troponin with normal CKs.   - Echocardiogram with normal EF and no RWMA.   - Plan for LHC vs. NST, but due to development of new Neurological symptoms concerning for CVA, would recommend NST.   - Plan for NST after Neurological workup and MRI are completed.   - Continue aspirin, metoprolol, and lipitor.

## 2023-02-23 NOTE — CONSULT NOTE ADULT - NS ATTEND AMEND GEN_ALL_CORE FT
Agree with PA's assessment and plan    46 YO M w/presyncopal events all last week, had another event this morning where he felt light headed, with tunnel vision, without loss of conciseness. Pt has NSTEMI and was on Lovenox, however, its been held due to anemia.  - Likely orthostatic vs defecation syncope  - recommend hydration  - recommend addressing anemia - pt reports 20-30lb weight loss over the past 2 months.  - can obtain MRI brain if still concerned for an acute intracranial pathology  discussed above with pt
Patient seen and examined by me personally. Briefly this is a 47y year old Male with relevant history of DMII presenting to the ED from clinic with a near syncopal event, found to have anemia with Hgb of 8.8 and troponin elevation to 0.23. No chest pain and no ecg changes concerning for NSTEMI. At this time I worry that his troponin elevation is more related to demand ischemia from his anemia rather than ACS, given lack of definitive evidence. Risks of catheterization and heparinization outweigh the benefits.       Recommendations:   - Stop heparin gtt  - workup anemia and assess for possible bleeding  - Continue high doses statin and beta blocker  - Can perform NST vs. Cath when Hgb stable and bleeding not of concern.  - F/U TTE    Modesto Barajas MD  Interventional and Structural Cardiology  [phone]

## 2023-02-23 NOTE — DISCHARGE NOTE NURSING/CASE MANAGEMENT/SOCIAL WORK - PATIENT PORTAL LINK FT
You can access the FollowMyHealth Patient Portal offered by [facility] by registering at the following website: [email or web address]. By joining [facility]’s FollowMyHealth portal, you will also be able to view your health information using other applications (apps) compatible with our system.

## 2023-02-23 NOTE — PROGRESS NOTE ADULT - PROBLEM SELECTOR PLAN 2
- Another near syncopal event but now with AMS and neurological deficits.   - Unlikely vasovagal due to neurological changes.   - No events on telemetry.   - Neurology following.   - Continue telemetry monitoring.   - MRI pending.   - Eventual NST.

## 2023-02-23 NOTE — CONSULT NOTE ADULT - SUBJECTIVE AND OBJECTIVE BOX
HPI:  47yoM hx DM, on insulin, who was sent from an outpatient clinical for near syncopal event.  Pt states he was going to clinical for routine visit, was in the waiting area when he had acute onset of lightheadedness, generalized weakness and almost passed out. Pt reports few episodes of lightheadedness and near syncopal events throughout the week. Pt denies nausea, vomiting, diarrhea and states his appetite has been ‘okay’.  He reports he had been constipated in the week but had a bowel movement while in the ED.  While in ED, pt also with 2 episodes of hypotension that occurred upon standing.  Admission labs notable for elevated troponin. (20 Feb 2023 23:49)    NIHSS   PRE-MRS  ICH Score     REVIEW OF SYSTEMS    General:	    PAST MEDICAL & SURGICAL HISTORY:  Diabetes mellitus without complication  H/O hand surgery    Allergies  No Known Allergies    FAMILY HISTORY:  No pertinent family history in first degree relatives    Social History:  , lives with wife (20 Feb 2023 23:49)    MEDICATIONS  (STANDING):  aspirin enteric coated 81 milliGRAM(s) Oral daily  atorvastatin 40 milliGRAM(s) Oral at bedtime  dextrose 5%. 1000 milliLiter(s) (100 mL/Hr) IV Continuous <Continuous>  dextrose 5%. 1000 milliLiter(s) (50 mL/Hr) IV Continuous <Continuous>  dextrose 50% Injectable 25 Gram(s) IV Push once  dextrose 50% Injectable 12.5 Gram(s) IV Push once  dextrose 50% Injectable 25 Gram(s) IV Push once  enoxaparin Injectable 40 milliGRAM(s) SubCutaneous every 24 hours  glucagon  Injectable 1 milliGRAM(s) IntraMuscular once  influenza   Vaccine 0.5 milliLiter(s) IntraMuscular once  insulin lispro (ADMELOG) corrective regimen sliding scale   SubCutaneous three times a day before meals  metoprolol tartrate 25 milliGRAM(s) Oral two times a day    MEDICATIONS  (PRN):  acetaminophen     Tablet .. 650 milliGRAM(s) Oral every 6 hours PRN Temp greater or equal to 38C (100.4F), Mild Pain (1 - 3), Moderate Pain (4 - 6)  dextrose Oral Gel 15 Gram(s) Oral once PRN Blood Glucose LESS THAN 70 milliGRAM(s)/deciliter      CBC Full  -  ( 23 Feb 2023 09:20 )  WBC Count : 8.50 K/uL  RBC Count : 3.28 M/uL  Hemoglobin : 8.9 g/dL  Hematocrit : 27.1 %  Platelet Count - Automated : 549 K/uL  Mean Cell Volume : 82.6 fl  Mean Cell Hemoglobin : 27.1 pg  Mean Cell Hemoglobin Concentration : 32.8 gm/dL  Auto Neutrophil # : 6.09 K/uL  Auto Lymphocyte # : 1.75 K/uL  Auto Monocyte # : 0.56 K/uL  Auto Eosinophil # : 0.06 K/uL  Auto Basophil # : 0.02 K/uL  Auto Neutrophil % : 71.7 %  Auto Lymphocyte % : 20.6 %  Auto Monocyte % : 6.6 %  Auto Eosinophil % : 0.7 %  Auto Basophil % : 0.2 %    02-23    128<L>  |  92<L>  |  24.9<H>  ----------------------------<  228<H>  4.8   |  26.0  |  1.08    Ca    8.8      23 Feb 2023 09:20  Phos  2.6     02-23  Mg     1.8     02-23    TPro  7.6  /  Alb  2.8<L>  /  TBili  <0.2<L>  /  DBili  x   /  AST  7   /  ALT  <5  /  AlkPhos  150<H>  02-23      Vital Signs Last 24 Hrs  T(C): 36.7 (23 Feb 2023 08:36), Max: 37 (22 Feb 2023 17:09)  T(F): 98.1 (23 Feb 2023 08:36), Max: 98.6 (22 Feb 2023 17:09)  HR: 75 (23 Feb 2023 08:36) (75 - 96)  BP: 120/82 (23 Feb 2023 08:36) (117/79 - 143/93)  BP(mean): --  RR: 19 (23 Feb 2023 08:36) (18 - 19)  SpO2: 100% (23 Feb 2023 04:08) (100% - 100%)    NIH SS:  DATE:  TIME:  1A: Level of consciousness (0-3):   1B: Questions (0-2):   1C: Commands (0-2):   2: Gaze (0-2):   3: Visual fields (0-3):   4: Facial palsy (0-3):   MOTOR:  5A: Left arm motor drift (0-4):   5B: Right arm motor drift (0-4):   6A: Left leg motor drift (0-4):   6B: Right leg motor drift (0-4):   7: Limb ataxia (0-2):   SENSORY:  8: Sensation (0-2):   SPEECH:  9: Language (0-3):   10: Dysarthria (0-2):   EXTINCTION:  11: Extinction/inattention (0-2):     TOTAL SCORE:     General: No acute distress    NEUROLOGICAL EXAM: INCOMPLETE  Mental status: Awake, alert, oriented x3, speech fluent, follows commands, no neglect, normal memory   Cranial Nerves: No facial asymmetry, no nystagmus, no dysarthria,  tongue midline  Motor exam: Normal tone, no drift, 5/5 RUE, 5/5 RLE, 5/5 LUE, 5/5 LLE, normal fine finger movements.  Sensation: Intact to light touch   Coordination/ Gait: No dysmetria, gait not tested    Imaging (reviewed by me)    CT Brain Stroke Protocol (02.23.23 @ 09:36)     IMPRESSION: No evidence of an acute transcortical infarction or   hemorrhage. Findings discussed with Dr. Wiggins at 9:35 AM.    CT Angio Neck w/ IV Cont (02.23.23 @ 09:48)   CT PERFUSION:  Patient has only had less than 2 hours of symptoms may influence the CT   perfusion.  No core infarct or evidence of delayed mean transit time is identified.  CBF<30% volume: 0 ml  Tmax>6.0 s volume: 0 ml  Mismatch volume: 0 ml  Mismatch ratio: none  9.2 mm upper lobe pulmonary nodule within adjacent cystic cavity.   Dedicated chest CT is recommended.  5.1 millimeter left thyroid lobe nodule.    TTE Echo Complete w/ Contrast w/ Doppler (02.21.23 @ 16:56)   Summary:   1. Left ventricular ejection fraction, by visual estimation, is 65 to   70%.   2. Normal global left ventricular systolic function.   3. Normal left atrial size.   4. Normal right atrial size.   5. There is no evidence of pericardial effusion.   6. Mild mitral valve regurgitation.    TTE Echo Complete w/ Contrast w/ Doppler (02.21.23 @ 16:56)   Summary:   1. Left ventricular ejection fraction, by visual estimation, is 65 to   70%.   2. Normal global left ventricular systolic function.   3. Normal left atrial size.   4. Normal right atrial size.   5. There is no evidence of pericardial effusion.   6. Mild mitral valve regurgitation.       HPI:  47yoM hx DM, on insulin, who was sent from an outpatient clinical for near syncopal event.  Pt stated he was going to clinical for routine visit, was in the waiting area when he had acute onset of lightheadedness, generalized weakness and almost passed out. Pt reported few episodes of lightheadedness and near syncopal events throughout the week. Had preceding constipation.   While in ED, pt also with 2 episodes of hypotension that occurred upon standing.  Admission labs notable for elevated troponin. Code stroke called 0907 AM 2/23/23 [facility] 4 with right facial droop and right side weakness. stroke team assessment at 0925 [facility] 2 with left  facial palsy. CTA witout evidence of an acute transcortical infarction or hemorrhage.   NIHSS 4  PRE-MRS 0      General: not in distress unless noted	    PAST MEDICAL & SURGICAL HISTORY:  Diabetes mellitus without complication  H/O hand surgery    Allergies  No Known Allergies    FAMILY HISTORY:  No pertinent family history in first degree relatives    Social History:  , lives with wife    MEDICATIONS  (STANDING):  aspirin enteric coated 81 milliGRAM(s) Oral daily  atorvastatin 40 milliGRAM(s) Oral at bedtime  dextrose 5%. 1000 milliLiter(s) (100 mL/Hr) IV Continuous <Continuous>  dextrose 5%. 1000 milliLiter(s) (50 mL/Hr) IV Continuous <Continuous>  dextrose 50% Injectable 25 Gram(s) IV Push once  dextrose 50% Injectable 12.5 Gram(s) IV Push once  dextrose 50% Injectable 25 Gram(s) IV Push once  enoxaparin Injectable 40 milliGRAM(s) SubCutaneous every 24 hours  glucagon  Injectable 1 milliGRAM(s) IntraMuscular once  influenza   Vaccine 0.5 milliLiter(s) IntraMuscular once  insulin lispro (ADMELOG) corrective regimen sliding scale   SubCutaneous three times a day before meals  metoprolol tartrate 25 milliGRAM(s) Oral two times a day    MEDICATIONS  (PRN):  acetaminophen     Tablet .. 650 milliGRAM(s) Oral every 6 hours PRN Temp greater or equal to 38C (100.4F), Mild Pain (1 - 3), Moderate Pain (4 - 6)  dextrose Oral Gel 15 Gram(s) Oral once PRN Blood Glucose LESS THAN 70 milliGRAM(s)/deciliter      CBC Full  -  ( 23 Feb 2023 09:20 )  WBC Count : 8.50 K/uL  RBC Count : 3.28 M/uL  Hemoglobin : 8.9 g/dL  Hematocrit : 27.1 %  Platelet Count - Automated : 549 K/uL  Mean Cell Volume : 82.6 fl  Mean Cell Hemoglobin : 27.1 pg  Mean Cell Hemoglobin Concentration : 32.8 gm/dL  Auto Neutrophil # : 6.09 K/uL  Auto Lymphocyte # : 1.75 K/uL  Auto Monocyte # : 0.56 K/uL  Auto Eosinophil # : 0.06 K/uL  Auto Basophil # : 0.02 K/uL  Auto Neutrophil % : 71.7 %  Auto Lymphocyte % : 20.6 %  Auto Monocyte % : 6.6 %  Auto Eosinophil % : 0.7 %  Auto Basophil % : 0.2 %    02-23    128<L>  |  92<L>  |  24.9<H>  ----------------------------<  228<H>  4.8   |  26.0  |  1.08    Ca    8.8      23 Feb 2023 09:20  Phos  2.6     02-23  Mg     1.8     02-23    TPro  7.6  /  Alb  2.8<L>  /  TBili  <0.2<L>  /  DBili  x   /  AST  7   /  ALT  <5  /  AlkPhos  150<H>  02-23      Vital Signs Last 24 Hrs  T(C): 36.7 (23 Feb 2023 08:36), Max: 37 (22 Feb 2023 17:09)  T(F): 98.1 (23 Feb 2023 08:36), Max: 98.6 (22 Feb 2023 17:09)  HR: 75 (23 Feb 2023 08:36) (75 - 96)  BP: 120/82 (23 Feb 2023 08:36) (117/79 - 143/93)  BP(mean): --  RR: 19 (23 Feb 2023 08:36) (18 - 19)  SpO2: 100% (23 Feb 2023 04:08) (100% - 100%)    NIH SS:2  DATE: 2/23/23  TIME: 0925  1A: Level of consciousness (0-3): 0  1B: Questions (0-2): 0  1C: Commands (0-2): 0  2: Gaze (0-2): 0  3: Visual fields (0-3): 1  4: Facial palsy (0-3): 1  MOTOR:  5A: Left arm motor drift (0-4): 0  5B: Right arm motor drift (0-4): 0  6A: Left leg motor drift (0-4): 0  6B: Right leg motor drift (0-4): 0  7: Limb ataxia (0-2): 0  SENSORY:  8: Sensation (0-2): 0  SPEECH:  9: Language (0-3): 0  10: Dysarthria (0-2): 0  EXTINCTION:  11: Extinction/inattention (0-2): 0    TOTAL SCORE: 2    General: No acute distress    NEUROLOGICAL EXAM:   Mental status: Awake, alert, oriented x3, speech fluent, follows commands,  normal memory   Cranial Nerves: left facial palsy, bilateral visual neglect, no nystagmus, no dysarthria,  tongue midline  Motor exam: Normal tone, no drift, 5/5 RUE, 5/5 RLE, 5/5 LUE, 5/5 LLE, normal fine finger movements.  Sensation: Intact to light touch   Coordination/ Gait: No dysmetria, gait not tested    Imaging (reviewed by me)    CT Brain Stroke Protocol (02.23.23 @ 09:36)     IMPRESSION: No evidence of an acute transcortical infarction or   hemorrhage. Findings discussed with Dr. Wiggins at 9:35 AM.    CT Angio Neck w/ IV Cont (02.23.23 @ 09:48)   CT PERFUSION:  Patient has only had less than 2 hours of symptoms may influence the CT   perfusion.  No core infarct or evidence of delayed mean transit time is identified.  CBF<30% volume: 0 ml  Tmax>6.0 s volume: 0 ml  Mismatch volume: 0 ml  Mismatch ratio: none  9.2 mm upper lobe pulmonary nodule within adjacent cystic cavity.   Dedicated chest CT is recommended.  5.1 millimeter left thyroid lobe nodule.    TTE Echo Complete w/ Contrast w/ Doppler (02.21.23 @ 16:56)   Summary:   1. Left ventricular ejection fraction, by visual estimation, is 65 to   70%.   2. Normal global left ventricular systolic function.   3. Normal left atrial size.   4. Normal right atrial size.   5. There is no evidence of pericardial effusion.   6. Mild mitral valve regurgitation.    TTE Echo Complete w/ Contrast w/ Doppler (02.21.23 @ 16:56)   Summary:   1. Left ventricular ejection fraction, by visual estimation, is 65 to   70%.   2. Normal global left ventricular systolic function.   3. Normal left atrial size.   4. Normal right atrial size.   5. There is no evidence of pericardial effusion.   6. Mild mitral valve regurgitation.         PRELMINARY NOTE, official recommendations pending attending review and signature.      ID [id number]  HPI:  47yoM hx DM, on insulin, who was sent from an outpatient clinical for near syncopal event.  Pt stated he was going to clinical for routine visit, was in the waiting area when he had acute onset of lightheadedness, generalized weakness and almost passed out. Pt reported few episodes of lightheadedness and near syncopal events throughout the week. Had preceding constipation.   While in ED, pt also with 2 episodes of hypotension that occurred upon standing.  Admission labs notable for elevated troponin. Code stroke called 0907 AM 2/23/23 [facility] 4 with right facial droop and right side weakness. Stroke team assessment at 0925 [facility] 2 with left  facial palsy. CT witout evidence of an acute transcortical infarction or hemorrhage. CT angiogram without evidence of larger artery occlusion. Patient was not Tenecteplase candidate as per MD Long per d/w primary team given recent anemia and concern for bleeding.  Previous heparin regimen was d/c due to this concern by report.   patient reports chronic bilateral impaired visual fields as has cataracts.    PRE-MRS 0    ROS: Feels back to normal, no new visual disturbances, generalized weakness, denies unilateral weakness. Notes he couldn't see during event but that has resolved.  Baseline visual field impairment due to cataracts.     PAST MEDICAL & SURGICAL HISTORY:  Diabetes mellitus without complication  H/O hand surgery    Allergies  No Known Allergies    FAMILY HISTORY:  No pertinent family history in first degree relatives    Social History:  , lives with wife    MEDICATIONS  (STANDING):  aspirin enteric coated 81 milliGRAM(s) Oral daily  atorvastatin 40 milliGRAM(s) Oral at bedtime  dextrose 5%. 1000 milliLiter(s) (100 mL/Hr) IV Continuous <Continuous>  dextrose 5%. 1000 milliLiter(s) (50 mL/Hr) IV Continuous <Continuous>  dextrose 50% Injectable 25 Gram(s) IV Push once  dextrose 50% Injectable 12.5 Gram(s) IV Push once  dextrose 50% Injectable 25 Gram(s) IV Push once  enoxaparin Injectable 40 milliGRAM(s) SubCutaneous every 24 hours  glucagon  Injectable 1 milliGRAM(s) IntraMuscular once  influenza   Vaccine 0.5 milliLiter(s) IntraMuscular once  insulin lispro (ADMELOG) corrective regimen sliding scale   SubCutaneous three times a day before meals  metoprolol tartrate 25 milliGRAM(s) Oral two times a day    MEDICATIONS  (PRN):  acetaminophen     Tablet .. 650 milliGRAM(s) Oral every 6 hours PRN Temp greater or equal to 38C (100.4F), Mild Pain (1 - 3), Moderate Pain (4 - 6)  dextrose Oral Gel 15 Gram(s) Oral once PRN Blood Glucose LESS THAN 70 milliGRAM(s)/deciliter      CBC Full  -  ( 23 Feb 2023 09:20 )  WBC Count : 8.50 K/uL  RBC Count : 3.28 M/uL  Hemoglobin : 8.9 g/dL  Hematocrit : 27.1 %  Platelet Count - Automated : 549 K/uL  Mean Cell Volume : 82.6 fl  Mean Cell Hemoglobin : 27.1 pg  Mean Cell Hemoglobin Concentration : 32.8 gm/dL  Auto Neutrophil # : 6.09 K/uL  Auto Lymphocyte # : 1.75 K/uL  Auto Monocyte # : 0.56 K/uL  Auto Eosinophil # : 0.06 K/uL  Auto Basophil # : 0.02 K/uL  Auto Neutrophil % : 71.7 %  Auto Lymphocyte % : 20.6 %  Auto Monocyte % : 6.6 %  Auto Eosinophil % : 0.7 %  Auto Basophil % : 0.2 %    02-23    128<L>  |  92<L>  |  24.9<H>  ----------------------------<  228<H>  4.8   |  26.0  |  1.08    Ca    8.8      23 Feb 2023 09:20  Phos  2.6     02-23  Mg     1.8     02-23    TPro  7.6  /  Alb  2.8<L>  /  TBili  <0.2<L>  /  DBili  x   /  AST  7   /  ALT  <5  /  AlkPhos  150<H>  02-23      Vital Signs Last 24 Hrs  T(C): 36.7 (23 Feb 2023 08:36), Max: 37 (22 Feb 2023 17:09)  T(F): 98.1 (23 Feb 2023 08:36), Max: 98.6 (22 Feb 2023 17:09)  HR: 75 (23 Feb 2023 08:36) (75 - 96)  BP: 120/82 (23 Feb 2023 08:36) (117/79 - 143/93)  BP(mean): --  RR: 19 (23 Feb 2023 08:36) (18 - 19)  SpO2: 100% (23 Feb 2023 04:08) (100% - 100%)    NIH SS:2  DATE: 2/23/23  TIME: 0925  1A: Level of consciousness (0-3): 0  1B: Questions (0-2): 0  1C: Commands (0-2): 0  2: Gaze (0-2): 0  3: Visual fields (0-3): 2 (notes baseline)  4: Facial palsy (0-3): 1  MOTOR:  5A: Left arm motor drift (0-4): 0  5B: Right arm motor drift (0-4): 0  6A: Left leg motor drift (0-4): 0  6B: Right leg motor drift (0-4): 0  7: Limb ataxia (0-2): 0  SENSORY:  8: Sensation (0-2): 0  SPEECH:  9: Language (0-3): 0  10: Dysarthria (0-2): 0  EXTINCTION:  11: Extinction/inattention (0-2): 0    TOTAL SCORE: 3    General: No acute distress    NEUROLOGICAL EXAM:   Mental status: Awake, alert, oriented x person, place, time, events,  speech fluent, follows commands,  normal memory , able to ID objects and function.   Cranial Nerves: left facial palsy, bilateral visual neglect, no nystagmus, no dysarthria,  tongue midline  Motor exam: Normal tone, no drift, 5/5 RUE, 5/5 RLE, 5/5 LUE, 5/5 LLE, normal fine finger movements.  Sensation: Intact to light touch   Coordination/ Gait: No dysmetria, gait not tested    Imaging (reviewed by me)    CT Brain Stroke Protocol (02.23.23 @ 09:36)     IMPRESSION: No evidence of an acute transcortical infarction or   hemorrhage. Findings discussed with Dr. Wiggins at 9:35 AM.    CT Angio Neck w/ IV Cont (02.23.23 @ 09:48)   CT PERFUSION:  Patient has only had less than 2 hours of symptoms may influence the CT   perfusion.  No core infarct or evidence of delayed mean transit time is identified.  CBF<30% volume: 0 ml  Tmax>6.0 s volume: 0 ml  Mismatch volume: 0 ml  Mismatch ratio: none  9.2 mm upper lobe pulmonary nodule within adjacent cystic cavity.   Dedicated chest CT is recommended.  5.1 millimeter left thyroid lobe nodule.    ct< from: CT Angio Head w/ IV Cont (02.23.23 @ 09:48) >  Negative CTP. No large vessel occlusion. Consider MRI of the brain as   clinically warranted.          TTE Echo Complete w/ Contrast w/ Doppler (02.21.23 @ 16:56)   Summary:   1. Left ventricular ejection fraction, by visual estimation, is 65 to   70%.   2. Normal global left ventricular systolic function.   3. Normal left atrial size.   4. Normal right atrial size.   5. There is no evidence of pericardial effusion.   6. Mild mitral valve regurgitation.    TTE Echo Complete w/ Contrast w/ Doppler (02.21.23 @ 16:56)   Summary:   1. Left ventricular ejection fraction, by visual estimation, is 65 to   70%.   2. Normal global left ventricular systolic function.   3. Normal left atrial size.   4. Normal right atrial size.   5. There is no evidence of pericardial effusion.   6. Mild mitral valve regurgitation.         PRELMINARY NOTE, official recommendations pending attending review and signature.      ID [id number]  HPI:  47yoM hx DM, on insulin, who was sent from an outpatient clinical for near syncopal event.  Pt stated he was going to clinical for routine visit, was in the waiting area when he had acute onset of lightheadedness, generalized weakness and almost passed out. Pt reported few episodes of lightheadedness and near syncopal events throughout the week. Had preceding constipation.   While in ED, pt also with 2 episodes of hypotension that occurred upon standing.  Admission labs notable for elevated troponin. Code stroke called 0907 AM 2/23/23 [facility] 4 with right facial droop and right side weakness. Stroke team assessment at 0925 [facility] 2 with left  facial palsy. CT witout evidence of an acute transcortical infarction or hemorrhage. CT angiogram without evidence of larger artery occlusion. Patient was not Tenecteplase candidate as per MD Long per d/w primary team given recent anemia and concern for bleeding.  Previous heparin regimen was d/c due to this concern by report.   patient reports chronic bilateral impaired visual fields as has cataracts.    PRE-MRS 0    ROS: Feels back to normal, no new visual disturbances, generalized weakness, denies unilateral weakness. Notes he couldn't see during event but that has resolved. Baseline visual field impairment due to cataracts.     PAST MEDICAL & SURGICAL HISTORY:  Diabetes mellitus without complication  H/O hand surgery    Allergies  No Known Allergies    FAMILY HISTORY:  No pertinent family history in first degree relatives    Social History:  , lives with wife    MEDICATIONS  (STANDING):  aspirin enteric coated 81 milliGRAM(s) Oral daily  atorvastatin 40 milliGRAM(s) Oral at bedtime  dextrose 5%. 1000 milliLiter(s) (100 mL/Hr) IV Continuous <Continuous>  dextrose 5%. 1000 milliLiter(s) (50 mL/Hr) IV Continuous <Continuous>  dextrose 50% Injectable 25 Gram(s) IV Push once  dextrose 50% Injectable 12.5 Gram(s) IV Push once  dextrose 50% Injectable 25 Gram(s) IV Push once  enoxaparin Injectable 40 milliGRAM(s) SubCutaneous every 24 hours  glucagon  Injectable 1 milliGRAM(s) IntraMuscular once  influenza   Vaccine 0.5 milliLiter(s) IntraMuscular once  insulin lispro (ADMELOG) corrective regimen sliding scale   SubCutaneous three times a day before meals  metoprolol tartrate 25 milliGRAM(s) Oral two times a day    MEDICATIONS  (PRN):  acetaminophen     Tablet .. 650 milliGRAM(s) Oral every 6 hours PRN Temp greater or equal to 38C (100.4F), Mild Pain (1 - 3), Moderate Pain (4 - 6)  dextrose Oral Gel 15 Gram(s) Oral once PRN Blood Glucose LESS THAN 70 milliGRAM(s)/deciliter      CBC Full  -  ( 23 Feb 2023 09:20 )  WBC Count : 8.50 K/uL  RBC Count : 3.28 M/uL  Hemoglobin : 8.9 g/dL  Hematocrit : 27.1 %  Platelet Count - Automated : 549 K/uL  Mean Cell Volume : 82.6 fl  Mean Cell Hemoglobin : 27.1 pg  Mean Cell Hemoglobin Concentration : 32.8 gm/dL  Auto Neutrophil # : 6.09 K/uL  Auto Lymphocyte # : 1.75 K/uL  Auto Monocyte # : 0.56 K/uL  Auto Eosinophil # : 0.06 K/uL  Auto Basophil # : 0.02 K/uL  Auto Neutrophil % : 71.7 %  Auto Lymphocyte % : 20.6 %  Auto Monocyte % : 6.6 %  Auto Eosinophil % : 0.7 %  Auto Basophil % : 0.2 %    02-23    128<L>  |  92<L>  |  24.9<H>  ----------------------------<  228<H>  4.8   |  26.0  |  1.08    Ca    8.8      23 Feb 2023 09:20  Phos  2.6     02-23  Mg     1.8     02-23    TPro  7.6  /  Alb  2.8<L>  /  TBili  <0.2<L>  /  DBili  x   /  AST  7   /  ALT  <5  /  AlkPhos  150<H>  02-23      Vital Signs Last 24 Hrs  T(C): 36.7 (23 Feb 2023 08:36), Max: 37 (22 Feb 2023 17:09)  T(F): 98.1 (23 Feb 2023 08:36), Max: 98.6 (22 Feb 2023 17:09)  HR: 75 (23 Feb 2023 08:36) (75 - 96)  BP: 120/82 (23 Feb 2023 08:36) (117/79 - 143/93)  BP(mean): --  RR: 19 (23 Feb 2023 08:36) (18 - 19)  SpO2: 100% (23 Feb 2023 04:08) (100% - 100%)    NIH SS:  DATE: 2/23/23  TIME: 0925  1A: Level of consciousness (0-3): 0  1B: Questions (0-2): 0  1C: Commands (0-2): 0  2: Gaze (0-2): 0  3: Visual fields (0-3): 2 (notes baseline)  4: Facial palsy (0-3): 1  MOTOR:  5A: Left arm motor drift (0-4): 0  5B: Right arm motor drift (0-4): 0  6A: Left leg motor drift (0-4): 0  6B: Right leg motor drift (0-4): 0  7: Limb ataxia (0-2): 0  SENSORY:  8: Sensation (0-2): 0  SPEECH:  9: Language (0-3): 0  10: Dysarthria (0-2): 0  EXTINCTION:  11: Extinction/inattention (0-2): 0    TOTAL SCORE: 3    General: No acute distress    NEUROLOGICAL EXAM:   Mental status: Awake, alert, oriented x person, place, time, events, speech fluent, follows commands, normal memory, able to ID objects and function.   Cranial Nerves: left facial palsy, bilateral visual neglect, no nystagmus, no dysarthria,  tongue midline  Motor exam: Normal tone, no drift, 5/5 RUE, 5/5 RLE, 5/5 LUE, 5/5 LLE, normal fine finger movements.  Sensation: Intact to light touch   Coordination/ Gait: No dysmetria, gait not tested    Imaging (reviewed by me)    CT Brain Stroke Protocol (02.23.23 @ 09:36)   IMPRESSION: No evidence of an acute transcortical infarction or   hemorrhage. Findings discussed with Dr. Wiggins at 9:35 AM.    CT Angio Neck w/ IV Cont (02.23.23 @ 09:48)   CT PERFUSION:  Patient has only had less than 2 hours of symptoms may influence the CT   perfusion.  No core infarct or evidence of delayed mean transit time is identified.  CBF<30% volume: 0 ml  Tmax>6.0 s volume: 0 ml  Mismatch volume: 0 ml  Mismatch ratio: none  9.2 mm upper lobe pulmonary nodule within adjacent cystic cavity.   Dedicated chest CT is recommended.  5.1 millimeter left thyroid lobe nodule.    from: CT Angio Head w/ IV Cont (02.23.23 @ 09:48)   Negative CTP. No large vessel occlusion. Consider MRI of the brain as   clinically warranted.    TTE Echo Complete w/ Contrast w/ Doppler (02.21.23 @ 16:56)   Summary:   1. Left ventricular ejection fraction, by visual estimation, is 65 to   70%.   2. Normal global left ventricular systolic function.   3. Normal left atrial size.   4. Normal right atrial size.   5. There is no evidence of pericardial effusion.   6. Mild mitral valve regurgitation.    TTE Echo Complete w/ Contrast w/ Doppler (02.21.23 @ 16:56)   Summary:   1. Left ventricular ejection fraction, by visual estimation, is 65 to   70%.   2. Normal global left ventricular systolic function.   3. Normal left atrial size.   4. Normal right atrial size.   5. There is no evidence of pericardial effusion.   6. Mild mitral valve regurgitation.

## 2023-02-23 NOTE — DISCHARGE NOTE NURSING/CASE MANAGEMENT/SOCIAL WORK - NSDPDISTO_GEN_ALL_CORE
I will SWITCH the dose or number of times a day I take the medications listed below when I get home from the hospital:    NIFEdipine 60 mg oral tablet, extended release  -- 1 tab(s) by mouth once a day    hydrALAZINE 25 mg oral tablet  -- 1 tab(s) by mouth 2 times a day Home

## 2023-02-24 LAB
ALBUMIN SERPL ELPH-MCNC: 2.6 G/DL — LOW (ref 3.3–5.2)
ALP SERPL-CCNC: 133 U/L — HIGH (ref 40–120)
ALT FLD-CCNC: <5 U/L — SIGNIFICANT CHANGE UP
ANION GAP SERPL CALC-SCNC: 11 MMOL/L — SIGNIFICANT CHANGE UP (ref 5–17)
AST SERPL-CCNC: 5 U/L — SIGNIFICANT CHANGE UP
BASOPHILS # BLD AUTO: 0.02 K/UL — SIGNIFICANT CHANGE UP (ref 0–0.2)
BASOPHILS NFR BLD AUTO: 0.3 % — SIGNIFICANT CHANGE UP (ref 0–2)
BILIRUB SERPL-MCNC: <0.2 MG/DL — LOW (ref 0.4–2)
BUN SERPL-MCNC: 25.3 MG/DL — HIGH (ref 8–20)
CALCIUM SERPL-MCNC: 8.4 MG/DL — SIGNIFICANT CHANGE UP (ref 8.4–10.5)
CHLORIDE SERPL-SCNC: 96 MMOL/L — SIGNIFICANT CHANGE UP (ref 96–108)
CO2 SERPL-SCNC: 24 MMOL/L — SIGNIFICANT CHANGE UP (ref 22–29)
CREAT SERPL-MCNC: 0.92 MG/DL — SIGNIFICANT CHANGE UP (ref 0.5–1.3)
EGFR: 103 ML/MIN/1.73M2 — SIGNIFICANT CHANGE UP
EOSINOPHIL # BLD AUTO: 0.07 K/UL — SIGNIFICANT CHANGE UP (ref 0–0.5)
EOSINOPHIL NFR BLD AUTO: 1 % — SIGNIFICANT CHANGE UP (ref 0–6)
GLUCOSE BLDC GLUCOMTR-MCNC: 134 MG/DL — HIGH (ref 70–99)
GLUCOSE BLDC GLUCOMTR-MCNC: 219 MG/DL — HIGH (ref 70–99)
GLUCOSE BLDC GLUCOMTR-MCNC: 238 MG/DL — HIGH (ref 70–99)
GLUCOSE BLDC GLUCOMTR-MCNC: 265 MG/DL — HIGH (ref 70–99)
GLUCOSE SERPL-MCNC: 221 MG/DL — HIGH (ref 70–99)
HCT VFR BLD CALC: 27.2 % — LOW (ref 39–50)
HGB BLD-MCNC: 8.6 G/DL — LOW (ref 13–17)
IMM GRANULOCYTES NFR BLD AUTO: 0.6 % — SIGNIFICANT CHANGE UP (ref 0–0.9)
LYMPHOCYTES # BLD AUTO: 1.65 K/UL — SIGNIFICANT CHANGE UP (ref 1–3.3)
LYMPHOCYTES # BLD AUTO: 24.1 % — SIGNIFICANT CHANGE UP (ref 13–44)
MAGNESIUM SERPL-MCNC: 1.8 MG/DL — SIGNIFICANT CHANGE UP (ref 1.6–2.6)
MCHC RBC-ENTMCNC: 26.9 PG — LOW (ref 27–34)
MCHC RBC-ENTMCNC: 31.6 GM/DL — LOW (ref 32–36)
MCV RBC AUTO: 85 FL — SIGNIFICANT CHANGE UP (ref 80–100)
MONOCYTES # BLD AUTO: 0.53 K/UL — SIGNIFICANT CHANGE UP (ref 0–0.9)
MONOCYTES NFR BLD AUTO: 7.7 % — SIGNIFICANT CHANGE UP (ref 2–14)
NEUTROPHILS # BLD AUTO: 4.55 K/UL — SIGNIFICANT CHANGE UP (ref 1.8–7.4)
NEUTROPHILS NFR BLD AUTO: 66.3 % — SIGNIFICANT CHANGE UP (ref 43–77)
PHOSPHATE SERPL-MCNC: 3.2 MG/DL — SIGNIFICANT CHANGE UP (ref 2.4–4.7)
PLATELET # BLD AUTO: 506 K/UL — HIGH (ref 150–400)
POTASSIUM SERPL-MCNC: 4.8 MMOL/L — SIGNIFICANT CHANGE UP (ref 3.5–5.3)
POTASSIUM SERPL-SCNC: 4.8 MMOL/L — SIGNIFICANT CHANGE UP (ref 3.5–5.3)
PROT SERPL-MCNC: 6.9 G/DL — SIGNIFICANT CHANGE UP (ref 6.6–8.7)
RBC # BLD: 3.2 M/UL — LOW (ref 4.2–5.8)
RBC # FLD: 14.6 % — HIGH (ref 10.3–14.5)
SODIUM SERPL-SCNC: 131 MMOL/L — LOW (ref 135–145)
WBC # BLD: 6.86 K/UL — SIGNIFICANT CHANGE UP (ref 3.8–10.5)
WBC # FLD AUTO: 6.86 K/UL — SIGNIFICANT CHANGE UP (ref 3.8–10.5)

## 2023-02-24 PROCEDURE — 99233 SBSQ HOSP IP/OBS HIGH 50: CPT

## 2023-02-24 RX ORDER — INSULIN LISPRO 100/ML
VIAL (ML) SUBCUTANEOUS
Refills: 0 | Status: DISCONTINUED | OUTPATIENT
Start: 2023-02-24 | End: 2023-02-27

## 2023-02-24 RX ORDER — SODIUM CHLORIDE 9 MG/ML
1000 INJECTION INTRAMUSCULAR; INTRAVENOUS; SUBCUTANEOUS
Refills: 0 | Status: DISCONTINUED | OUTPATIENT
Start: 2023-02-24 | End: 2023-02-24

## 2023-02-24 RX ORDER — METOPROLOL TARTRATE 50 MG
2.5 TABLET ORAL ONCE
Refills: 0 | Status: COMPLETED | OUTPATIENT
Start: 2023-02-24 | End: 2023-02-24

## 2023-02-24 RX ORDER — MAGNESIUM OXIDE 400 MG ORAL TABLET 241.3 MG
400 TABLET ORAL
Refills: 0 | Status: COMPLETED | OUTPATIENT
Start: 2023-02-24 | End: 2023-02-26

## 2023-02-24 RX ORDER — SODIUM CHLORIDE 9 MG/ML
500 INJECTION INTRAMUSCULAR; INTRAVENOUS; SUBCUTANEOUS ONCE
Refills: 0 | Status: COMPLETED | OUTPATIENT
Start: 2023-02-24 | End: 2023-02-24

## 2023-02-24 RX ORDER — FERROUS SULFATE 325(65) MG
325 TABLET ORAL DAILY
Refills: 0 | Status: DISCONTINUED | OUTPATIENT
Start: 2023-02-24 | End: 2023-02-27

## 2023-02-24 RX ADMIN — Medication 2: at 12:55

## 2023-02-24 RX ADMIN — Medication 81 MILLIGRAM(S): at 12:55

## 2023-02-24 RX ADMIN — Medication 25 MILLIGRAM(S): at 06:17

## 2023-02-24 RX ADMIN — SODIUM CHLORIDE 75 MILLILITER(S): 9 INJECTION INTRAMUSCULAR; INTRAVENOUS; SUBCUTANEOUS at 10:55

## 2023-02-24 RX ADMIN — SODIUM CHLORIDE 75 MILLILITER(S): 9 INJECTION INTRAMUSCULAR; INTRAVENOUS; SUBCUTANEOUS at 17:51

## 2023-02-24 RX ADMIN — SODIUM CHLORIDE 1000 MILLILITER(S): 9 INJECTION INTRAMUSCULAR; INTRAVENOUS; SUBCUTANEOUS at 10:55

## 2023-02-24 RX ADMIN — ATORVASTATIN CALCIUM 40 MILLIGRAM(S): 80 TABLET, FILM COATED ORAL at 21:05

## 2023-02-24 RX ADMIN — Medication 3: at 09:24

## 2023-02-24 RX ADMIN — Medication 2.5 MILLIGRAM(S): at 23:34

## 2023-02-24 RX ADMIN — ENOXAPARIN SODIUM 40 MILLIGRAM(S): 100 INJECTION SUBCUTANEOUS at 09:24

## 2023-02-24 RX ADMIN — Medication 2: at 17:47

## 2023-02-24 RX ADMIN — MAGNESIUM OXIDE 400 MG ORAL TABLET 400 MILLIGRAM(S): 241.3 TABLET ORAL at 17:46

## 2023-02-24 RX ADMIN — Medication 25 MILLIGRAM(S): at 17:47

## 2023-02-24 NOTE — DIETITIAN INITIAL EVALUATION ADULT - PERTINENT LABORATORY DATA
02-24    131<L>  |  96  |  25.3<H>  ----------------------------<  221<H>  4.8   |  24.0  |  0.92    Ca    8.4      24 Feb 2023 07:38  Phos  3.2     02-24  Mg     1.8     02-24    TPro  6.9  /  Alb  2.6<L>  /  TBili  <0.2<L>  /  DBili  x   /  AST  5   /  ALT  <5  /  AlkPhos  133<H>  02-24  POCT Blood Glucose.: 265 mg/dL (02-24-23 @ 08:40)  A1C with Estimated Average Glucose Result: 8.3 % (02-21-23 @ 06:53)

## 2023-02-24 NOTE — PROGRESS NOTE ADULT - SUBJECTIVE AND OBJECTIVE BOX
Non-ST elevation myocardial infarction (NSTEMI)    HPI:  47yoM hx DM, on insulin, who was sent from an outpatient clinical for near syncopal event.  Pt states he was going to clinical for routine visit, was in the waiting area when he had acute onset of lightheadedness, generalized weakness and almost passed out. Pt reports few episodes of lightheadedness and near syncopal events throughout the week. Pt denies nausea, vomiting, diarrhea and states his appetite has been ‘okay’.  He reports he had been constipated in the week but had a bowel movement while in the ED.  While in ED, pt also with 2 episodes of hypotension that occurred upon standing.  Admission labs notable for elevated troponin. (20 Feb 2023 23:49)    Interval History:  Patient was seen and examined at bedside around 10:30 am.  Feels OK.  No more facial droop or right sided weakness.  Denies difficulty speaking/swallowing, headache, dizziness, new visual symptoms or paresthesia.   Denies chest pain, palpitations, shortness of breath, nausea, vomiting or abdominal pain.    ROS:  As per interval history otherwise unremarkable.    PHYSICAL EXAM:  Vital Signs   T(C): 36.6 (24 Feb 2023 12:48), Max: 36.9 (23 Feb 2023 16:50)  T(F): 97.8 (24 Feb 2023 12:48), Max: 98.4 (23 Feb 2023 16:50)  HR: 76 (24 Feb 2023 11:22) (70 - 83)  BP: 109/86 (24 Feb 2023 11:22) (88/61 - 151/104)  BP(mean): 95 (24 Feb 2023 11:22) (80 - 104)  RR: 16 (24 Feb 2023 11:22) (16 - 19)  SpO2: 100% (24 Feb 2023 11:22) (98% - 100%)  Parameters below as of 24 Feb 2023 11:22  Patient On (Oxygen Delivery Method): room air  General: Young male sitting in bed comfortably. No acute distress. Cachectic.   HEENT: Cataract in right eye. EOMI. Clear conjunctivae. Moist mucus membrane  Neck: Supple.   Chest: CTA bilaterally. No wheezing, rales or rhonchi. No chest wall tenderness.  Heart: Normal S1 & S2. RRR.   Abdomen: Non distended. Soft. Non-tender. + BS  Ext: No pedal edema. No calf tenderness. 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 Right hand contracted.    Neuro: AAO x 3. Motor 5/5 in all extremities. Sensation intact. Reflexes 1+. No speech disorder  Skin: Warm and Dry. Chronic scars in right forearm/hand and right thigh.   Psychiatry: Normal mood and affect    I&O's Summary    23 Feb 2023 07:01  -  24 Feb 2023 07:00  --------------------------------------------------------  IN: 1050 mL / OUT: 600 mL / NET: 450 mL    24 Feb 2023 07:01  -  24 Feb 2023 14:37  --------------------------------------------------------  IN: 650 mL / OUT: 650 mL / NET: 0 mL    LABS:  CAPILLARY BLOOD GLUCOSE  POCT Blood Glucose.: 219 mg/dL (24 Feb 2023 12:52)  POCT Blood Glucose.: 265 mg/dL (24 Feb 2023 08:40)  POCT Blood Glucose.: 286 mg/dL (23 Feb 2023 21:29)  POCT Blood Glucose.: 179 mg/dL (23 Feb 2023 17:33)                      8.6    6.86  )-----------( 506      ( 24 Feb 2023 07:38 )             27.2     02-24    131<L>  |  96  |  25.3<H>  ----------------------------<  221<H>  4.8   |  24.0  |  0.92    Ca    8.4      24 Feb 2023 07:38  Phos  3.2     02-24  Mg     1.8     02-24    TPro  6.9  /  Alb  2.6<L>  /  TBili  <0.2<L>  /  DBili  x   /  AST  5   /  ALT  <5  /  AlkPhos  133<H>  02-24    PT/INR - ( 23 Feb 2023 10:30 )   PT: 14.6 sec;   INR: 1.26 ratio       PTT - ( 23 Feb 2023 10:30 )  PTT:41.0 sec    RADIOLOGY & ADDITIONAL STUDIES:  Reviewed     MEDICATIONS  (STANDING):  aspirin enteric coated 81 milliGRAM(s) Oral daily  atorvastatin 40 milliGRAM(s) Oral at bedtime  dextrose 5%. 1000 milliLiter(s) (100 mL/Hr) IV Continuous <Continuous>  dextrose 5%. 1000 milliLiter(s) (50 mL/Hr) IV Continuous <Continuous>  dextrose 50% Injectable 25 Gram(s) IV Push once  dextrose 50% Injectable 12.5 Gram(s) IV Push once  dextrose 50% Injectable 25 Gram(s) IV Push once  enoxaparin Injectable 40 milliGRAM(s) SubCutaneous every 24 hours  glucagon  Injectable 1 milliGRAM(s) IntraMuscular once  influenza   Vaccine 0.5 milliLiter(s) IntraMuscular once  insulin lispro (ADMELOG) corrective regimen sliding scale   SubCutaneous three times a day before meals  metoprolol tartrate 25 milliGRAM(s) Oral two times a day  sodium chloride 0.9%. 1000 milliLiter(s) (75 mL/Hr) IV Continuous <Continuous>    MEDICATIONS  (PRN):  acetaminophen     Tablet .. 650 milliGRAM(s) Oral every 6 hours PRN Temp greater or equal to 38C (100.4F), Mild Pain (1 - 3), Moderate Pain (4 - 6)  dextrose Oral Gel 15 Gram(s) Oral once PRN Blood Glucose LESS THAN 70 milliGRAM(s)/deciliter

## 2023-02-24 NOTE — DIETITIAN INITIAL EVALUATION ADULT - ORAL INTAKE PTA/DIET HISTORY
Spoke with pt via  (#[id number]). Pt reports decreased appetite over past few months; endorses intermittent diarrhea and   vomiting. Pt reports unintentional weight loss of over 15lbs in less then 3 months. Pt reports following a Diabetic diet.

## 2023-02-24 NOTE — PROGRESS NOTE ADULT - ASSESSMENT
48 y/o Malay speaking M w/ PMH of IDDM, chronic anemia (baseline Hb unknown), was sent from cardiology office for near syncopal event w/ episodes of hypotension while in ED upon standing and with labs notable for elevated troponins w/ EKG changes admitted for for further work up.  Trops have since trended down.  LHC postponed and heparin gtt d/c' in light of anemia.  Code stroke called on 2/23 due to acute onset Rt facial droop, rt sided weakness and slurred speech.  Pt upgraded to SDU.     1) Near Syncope   - Elevated Tropnin  - No CP at this time and tele w/out events   - Possibly demand ischemia from syncopal/hypotensive episodes  - Troponins down trending (0.23-->0.14)  - EKG showing new TWI in II, III, V4-V6 on 3rd EKG  - d/c'd heparin gtt in light of H/H trending down but will c/w ASA   - c/w statin therapy   - s/p 3L of LR on admission   - Continue monitoring on telem  - Cardiology following and recs noted: NST once stroke work up is complete     2) AMS w/ acute onset Rt sided deficits  - Code stroke called on 2/23   - Symptoms now completely resolved   - CT Head / Perfusion Scan / CTA Head & Neck with no acute findings   - MRI pending    - VS and blood glucose during event nL   - Neuro consult noted     3) Hypovolemic Hyponatremic hypochloremia   - suspect due to poor po intake / dehydration    - Improving after IVFs; will c/s gentle hydration   - Encourage po intake   - Monitor BMP    4) Normocytic Anemia  - Pt reports hx of anemia requiring blood transfusions in the past  - Does not know baseline hb or why he has anemia   - Clinically malnutrition which can contribute   - Iron panel noted   - B12/folate levels nL   - No reports of melena or hematochezia but elevated BUN/Cr ratio could be from dehydration but FOBT ordered    - Monitor CBC and transfuse for Hb<8     5) IDDM  - A1c 8.3  - Accu checks and ISS    6) Severe protein calorie malnutrition   - Pt cachectic   - Has poor po intake and unintentional weight loss   - Will recommend outpatient work up with PMD for age appropriate screening     DVT Prophylaxis -- Lovenox SQ    Dispo: Home once stable.

## 2023-02-24 NOTE — CHART NOTE - NSCHARTNOTEFT_GEN_A_CORE
Called by RN for patient with elevated /93. repeated manual /102 and 177/106. Patient asymptomatic and rest of VSS.     Vitals  T(C): 36.8 (02-24-23 @ 23:05), Max: 36.8 (02-24-23 @ 00:00)  HR: 77 (02-24-23 @ 23:05) (70 - 81)  BP: 177/106 (02-24-23 @ 23:05) (88/61 - 177/106)  RR: 17 (02-24-23 @ 23:05) (16 - 19)  SpO2: 98% (02-24-23 @ 23:05) (98% - 100%)    Placed on maintenance fluids earlier today for hypotension. Discontinue fluids.   Lopressor 2.5mg IV x1 given.   Permissible HTN for -140 as per neuro.   Will continue to monitor and RN to notify provider with any changes in patient status.

## 2023-02-24 NOTE — DIETITIAN INITIAL EVALUATION ADULT - PERTINENT MEDS FT
MEDICATIONS  (STANDING):  aspirin enteric coated 81 milliGRAM(s) Oral daily  atorvastatin 40 milliGRAM(s) Oral at bedtime  dextrose 5%. 1000 milliLiter(s) (100 mL/Hr) IV Continuous <Continuous>  dextrose 5%. 1000 milliLiter(s) (50 mL/Hr) IV Continuous <Continuous>  dextrose 50% Injectable 25 Gram(s) IV Push once  dextrose 50% Injectable 12.5 Gram(s) IV Push once  dextrose 50% Injectable 25 Gram(s) IV Push once  enoxaparin Injectable 40 milliGRAM(s) SubCutaneous every 24 hours  glucagon  Injectable 1 milliGRAM(s) IntraMuscular once  influenza   Vaccine 0.5 milliLiter(s) IntraMuscular once  insulin lispro (ADMELOG) corrective regimen sliding scale   SubCutaneous three times a day before meals  metoprolol tartrate 25 milliGRAM(s) Oral two times a day  sodium chloride 0.9% Bolus 500 milliLiter(s) IV Bolus once  sodium chloride 0.9%. 1000 milliLiter(s) (75 mL/Hr) IV Continuous <Continuous>    MEDICATIONS  (PRN):  acetaminophen     Tablet .. 650 milliGRAM(s) Oral every 6 hours PRN Temp greater or equal to 38C (100.4F), Mild Pain (1 - 3), Moderate Pain (4 - 6)  dextrose Oral Gel 15 Gram(s) Oral once PRN Blood Glucose LESS THAN 70 milliGRAM(s)/deciliter

## 2023-02-24 NOTE — DIETITIAN INITIAL EVALUATION ADULT - NUTRITION DIAGNOSIS
Dr. Ballesteros is present. Patient of Dr. Ballesteros.     Bertrand is here today for catheter change.  Catheter change was done per order of Dr. Ballesteros.  The existing catheter was removed without difficulty.  A new 16 FrenchSiliconeStraight was inserted without any difficulty.  The bladder was irrigated until clear.  The patient was hooked up to a New leg bag.  Patient was also given Efren strap , night bag and extension tubing. Patient tolerated procedure well.  he will return to see us again in 4 weeks.     Diagnosis: Neurogenic Bladder.          yes...

## 2023-02-24 NOTE — DIETITIAN INITIAL EVALUATION ADULT - OTHER INFO
Pt is a 46 y/o Botswanan speaking M w/ PMH of IDDM, chronic anemia (baseline Hb unknown), was sent from cardiology office for near syncopal event w/ episodes of hypotension while in ED upon standing and with labs notable for elevated troponins w/ EKG changes admitted for NSTEMI.  Trops have since trended down.  LHC postponed and heparin gtt d/c' in light of anemia.  Code stroke called today due to acute onset Rt facial droop, rt sided weakness and slurred speech.  Pt upgraded for q2h VS and neurochecks per neuro recs.

## 2023-02-24 NOTE — DIETITIAN INITIAL EVALUATION ADULT - ETIOLOGY
Related to inadequate protein-energy intake with unintentional weight loss in setting of unknown etiology (possible underlying malignancy)

## 2023-02-25 LAB
ANION GAP SERPL CALC-SCNC: 13 MMOL/L — SIGNIFICANT CHANGE UP (ref 5–17)
BUN SERPL-MCNC: 19.1 MG/DL — SIGNIFICANT CHANGE UP (ref 8–20)
CALCIUM SERPL-MCNC: 8.6 MG/DL — SIGNIFICANT CHANGE UP (ref 8.4–10.5)
CHLORIDE SERPL-SCNC: 98 MMOL/L — SIGNIFICANT CHANGE UP (ref 96–108)
CO2 SERPL-SCNC: 23 MMOL/L — SIGNIFICANT CHANGE UP (ref 22–29)
CREAT SERPL-MCNC: 0.74 MG/DL — SIGNIFICANT CHANGE UP (ref 0.5–1.3)
CULTURE RESULTS: SIGNIFICANT CHANGE UP
CULTURE RESULTS: SIGNIFICANT CHANGE UP
EGFR: 112 ML/MIN/1.73M2 — SIGNIFICANT CHANGE UP
GLUCOSE BLDC GLUCOMTR-MCNC: 179 MG/DL — HIGH (ref 70–99)
GLUCOSE BLDC GLUCOMTR-MCNC: 206 MG/DL — HIGH (ref 70–99)
GLUCOSE BLDC GLUCOMTR-MCNC: 207 MG/DL — HIGH (ref 70–99)
GLUCOSE BLDC GLUCOMTR-MCNC: 210 MG/DL — HIGH (ref 70–99)
GLUCOSE SERPL-MCNC: 198 MG/DL — HIGH (ref 70–99)
HCT VFR BLD CALC: 27.3 % — LOW (ref 39–50)
HGB BLD-MCNC: 8.7 G/DL — LOW (ref 13–17)
MAGNESIUM SERPL-MCNC: 1.7 MG/DL — SIGNIFICANT CHANGE UP (ref 1.6–2.6)
MCHC RBC-ENTMCNC: 26.9 PG — LOW (ref 27–34)
MCHC RBC-ENTMCNC: 31.9 GM/DL — LOW (ref 32–36)
MCV RBC AUTO: 84.5 FL — SIGNIFICANT CHANGE UP (ref 80–100)
PLATELET # BLD AUTO: 480 K/UL — HIGH (ref 150–400)
POTASSIUM SERPL-MCNC: 4.7 MMOL/L — SIGNIFICANT CHANGE UP (ref 3.5–5.3)
POTASSIUM SERPL-SCNC: 4.7 MMOL/L — SIGNIFICANT CHANGE UP (ref 3.5–5.3)
RBC # BLD: 3.23 M/UL — LOW (ref 4.2–5.8)
RBC # FLD: 14.5 % — SIGNIFICANT CHANGE UP (ref 10.3–14.5)
SODIUM SERPL-SCNC: 134 MMOL/L — LOW (ref 135–145)
SPECIMEN SOURCE: SIGNIFICANT CHANGE UP
SPECIMEN SOURCE: SIGNIFICANT CHANGE UP
WBC # BLD: 7.59 K/UL — SIGNIFICANT CHANGE UP (ref 3.8–10.5)
WBC # FLD AUTO: 7.59 K/UL — SIGNIFICANT CHANGE UP (ref 3.8–10.5)

## 2023-02-25 PROCEDURE — 99232 SBSQ HOSP IP/OBS MODERATE 35: CPT

## 2023-02-25 PROCEDURE — 70551 MRI BRAIN STEM W/O DYE: CPT | Mod: 26

## 2023-02-25 RX ADMIN — Medication 2: at 21:53

## 2023-02-25 RX ADMIN — Medication 2: at 12:31

## 2023-02-25 RX ADMIN — Medication 2: at 09:09

## 2023-02-25 RX ADMIN — ATORVASTATIN CALCIUM 40 MILLIGRAM(S): 80 TABLET, FILM COATED ORAL at 21:53

## 2023-02-25 RX ADMIN — MAGNESIUM OXIDE 400 MG ORAL TABLET 400 MILLIGRAM(S): 241.3 TABLET ORAL at 09:10

## 2023-02-25 RX ADMIN — Medication 1 TABLET(S): at 09:10

## 2023-02-25 RX ADMIN — Medication 25 MILLIGRAM(S): at 06:30

## 2023-02-25 RX ADMIN — ENOXAPARIN SODIUM 40 MILLIGRAM(S): 100 INJECTION SUBCUTANEOUS at 09:11

## 2023-02-25 RX ADMIN — Medication 325 MILLIGRAM(S): at 09:10

## 2023-02-25 RX ADMIN — Medication 25 MILLIGRAM(S): at 18:41

## 2023-02-25 RX ADMIN — MAGNESIUM OXIDE 400 MG ORAL TABLET 400 MILLIGRAM(S): 241.3 TABLET ORAL at 18:41

## 2023-02-25 RX ADMIN — Medication 1: at 17:59

## 2023-02-25 RX ADMIN — MAGNESIUM OXIDE 400 MG ORAL TABLET 400 MILLIGRAM(S): 241.3 TABLET ORAL at 12:30

## 2023-02-25 RX ADMIN — Medication 81 MILLIGRAM(S): at 09:11

## 2023-02-25 NOTE — PROGRESS NOTE ADULT - ASSESSMENT
48 y/o [place] speaking M w/ PMH of IDDM, chronic anemia (baseline Hb unknown), was sent from cardiology office for near syncopal event w/ episodes of hypotension while in ED upon standing and with labs notable for elevated troponins w/ EKG changes admitted for for further work up.  Trops have since trended down.  LHC postponed and heparin gtt d/c' in light of anemia.  Code stroke called on 2/23 due to acute onset Rt facial droop, rt sided weakness and slurred speech.  Pt upgraded to SDU.     1) Near Syncope   - Elevated Tropnin  - No CP at this time and tele w/out events   - Possibly demand ischemia from syncopal/hypotensive episodes  - Troponins down trending (0.23-->0.14)  - EKG showing new TWI in II, III, V4-V6 on 3rd EKG  - d/c'd heparin gtt in light of H/H trending down but will c/w ASA   - c/w statin therapy   - s/p 3L of LR on admission   - Continue monitoring on telem  - Cardiology following and recs noted: NST likely on Monday    2) AMS w/ acute onset Rt sided deficits  - Code stroke called on 2/23   - Symptoms now completely resolved   - CT Head / Perfusion Scan / CTA Head & Neck with no acute findings   - MRI with no acute infarct   - VS and blood glucose during event nL   - Neuro consult noted   - Stroke ruled out     3) Hypovolemic Hyponatremic hypochloremia   - suspect due to poor po intake / dehydration    - Improved with IVF  - Encourage po intake     4) Normocytic Anemia  - Pt reports hx of anemia requiring blood transfusions in the past  - Does not know baseline hb or why he has anemia   - Clinically malnutrition which can contribute   - Iron panel noted   - B12/folate levels nL   - No reports of melena or hematochezia but elevated BUN/Cr ratio could be from dehydration but FOBT ordered    - Monitor CBC and transfuse for Hb<8     5) IDDM  - A1c 8.3  - Accu checks and ISS    6) Severe protein calorie malnutrition   - Pt cachectic   - Has poor po intake and unintentional weight loss   - Will recommend outpatient work up with PMD for age appropriate screening   - Added MVI and Glucerna   - Nutrition eval noted     DVT Prophylaxis -- Lovenox SQ    Dispo: Home once stable.

## 2023-02-25 NOTE — PROGRESS NOTE ADULT - SUBJECTIVE AND OBJECTIVE BOX
Non-ST elevation myocardial infarction (NSTEMI)    HPI:  47yoM hx DM, on insulin, who was sent from an outpatient clinical for near syncopal event.  Pt states he was going to clinical for routine visit, was in the waiting area when he had acute onset of lightheadedness, generalized weakness and almost passed out. Pt reports few episodes of lightheadedness and near syncopal events throughout the week. Pt denies nausea, vomiting, diarrhea and states his appetite has been ‘okay’.  He reports he had been constipated in the week but had a bowel movement while in the ED.  While in ED, pt also with 2 episodes of hypotension that occurred upon standing.  Admission labs notable for elevated troponin. (20 Feb 2023 23:49)    Interval History:  Patient was seen and examined at bedside around 10:45 am.  Hypertensive overnight, was given a dose of Metoprolol 2.5 mg IVP.   Facial droop and right sided weakness have resolved.  Denies difficulty speaking/swallowing, headache, dizziness, new visual symptoms or paresthesia.   Denies chest pain, palpitations, shortness of breath, nausea, vomiting or abdominal pain.    ROS:  As per interval history otherwise unremarkable.    PHYSICAL EXAM:  Vital Signs   T(C): 36.4 (25 Feb 2023 12:04), Max: 36.8 (24 Feb 2023 23:05)  T(F): 97.6 (25 Feb 2023 12:04), Max: 98.3 (24 Feb 2023 23:05)  HR: 74 (25 Feb 2023 12:04) (74 - 81)  BP: 111/74 (25 Feb 2023 12:04) (111/74 - 177/106)  BP(mean): 98 (24 Feb 2023 20:03) (98 - 99)  RR: 18 (25 Feb 2023 12:04) (16 - 18)  SpO2: 97% (25 Feb 2023 12:04) (97% - 100%)  Parameters below as of 25 Feb 2023 12:04  Patient On (Oxygen Delivery Method): room air  General: Young male sitting in bed comfortably. No acute distress. Cachectic.   HEENT: Cataract in right eye. EOMI. Clear conjunctivae. Moist mucus membrane  Neck: Supple.   Chest: CTA bilaterally. No wheezing, rales or rhonchi. No chest wall tenderness.  Heart: Normal S1 & S2. RRR.   Abdomen: Non distended. Soft. Non-tender. + BS  Ext: No pedal edema. No calf tenderness. 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 Right hand contracted.    Neuro: AAO x 3. Motor 5/5 in all extremities. Sensation intact. Reflexes 1+. No speech disorder  Skin: Warm and Dry. Chronic scars in right forearm/hand and right thigh.   Psychiatry: Normal mood and affect    I&O's Summary    24 Feb 2023 07:01  -  25 Feb 2023 07:00  --------------------------------------------------------  IN: 2170 mL / OUT: 2300 mL / NET: -130 mL    25 Feb 2023 07:01  -  25 Feb 2023 15:32  --------------------------------------------------------  IN: 0 mL / OUT: 600 mL / NET: -600 mL    LABS:  CAPILLARY BLOOD GLUCOSE  POCT Blood Glucose.: 210 mg/dL (25 Feb 2023 11:58)  POCT Blood Glucose.: 206 mg/dL (25 Feb 2023 08:04)  POCT Blood Glucose.: 134 mg/dL (24 Feb 2023 21:04)  POCT Blood Glucose.: 238 mg/dL (24 Feb 2023 17:40)                      8.7    7.59  )-----------( 480      ( 25 Feb 2023 05:37 )             27.3     02-25    134<L>  |  98  |  19.1  ----------------------------<  198<H>  4.7   |  23.0  |  0.74    Ca    8.6      25 Feb 2023 05:37  Phos  3.2     02-24  Mg     1.7     02-25    TPro  6.9  /  Alb  2.6<L>  /  TBili  <0.2<L>  /  DBili  x   /  AST  5   /  ALT  <5  /  AlkPhos  133<H>  02-24    Blood Culture: 02-20 @ 16:55  Organism --  Gram Stain Blood -- Gram Stain --  Specimen Source .Blood Blood-Peripheral  Culture-Blood --    02-20 @ 16:45  Organism --  Gram Stain Blood -- Gram Stain --  Specimen Source .Blood Blood-Peripheral  Culture-Blood --    RADIOLOGY & ADDITIONAL STUDIES:  Reviewed     MEDICATIONS  (STANDING):  aspirin enteric coated 81 milliGRAM(s) Oral daily  atorvastatin 40 milliGRAM(s) Oral at bedtime  dextrose 5%. 1000 milliLiter(s) (100 mL/Hr) IV Continuous <Continuous>  dextrose 5%. 1000 milliLiter(s) (50 mL/Hr) IV Continuous <Continuous>  dextrose 50% Injectable 25 Gram(s) IV Push once  dextrose 50% Injectable 12.5 Gram(s) IV Push once  dextrose 50% Injectable 25 Gram(s) IV Push once  enoxaparin Injectable 40 milliGRAM(s) SubCutaneous every 24 hours  ferrous    sulfate 325 milliGRAM(s) Oral daily  glucagon  Injectable 1 milliGRAM(s) IntraMuscular once  influenza   Vaccine 0.5 milliLiter(s) IntraMuscular once  insulin lispro (ADMELOG) corrective regimen sliding scale   SubCutaneous Before meals and at bedtime  magnesium oxide 400 milliGRAM(s) Oral three times a day with meals  metoprolol tartrate 25 milliGRAM(s) Oral two times a day  multivitamin 1 Tablet(s) Oral daily    MEDICATIONS  (PRN):  acetaminophen     Tablet .. 650 milliGRAM(s) Oral every 6 hours PRN Temp greater or equal to 38C (100.4F), Mild Pain (1 - 3), Moderate Pain (4 - 6)  dextrose Oral Gel 15 Gram(s) Oral once PRN Blood Glucose LESS THAN 70 milliGRAM(s)/deciliter

## 2023-02-26 LAB
GLUCOSE BLDC GLUCOMTR-MCNC: 213 MG/DL — HIGH (ref 70–99)
GLUCOSE BLDC GLUCOMTR-MCNC: 223 MG/DL — HIGH (ref 70–99)
GLUCOSE BLDC GLUCOMTR-MCNC: 230 MG/DL — HIGH (ref 70–99)
GLUCOSE BLDC GLUCOMTR-MCNC: 250 MG/DL — HIGH (ref 70–99)
SARS-COV-2 RNA SPEC QL NAA+PROBE: SIGNIFICANT CHANGE UP

## 2023-02-26 PROCEDURE — 99232 SBSQ HOSP IP/OBS MODERATE 35: CPT

## 2023-02-26 RX ORDER — INSULIN GLARGINE 100 [IU]/ML
5 INJECTION, SOLUTION SUBCUTANEOUS EVERY MORNING
Refills: 0 | Status: DISCONTINUED | OUTPATIENT
Start: 2023-02-26 | End: 2023-02-27

## 2023-02-26 RX ADMIN — INSULIN GLARGINE 5 UNIT(S): 100 INJECTION, SOLUTION SUBCUTANEOUS at 09:02

## 2023-02-26 RX ADMIN — Medication 2: at 17:28

## 2023-02-26 RX ADMIN — Medication 81 MILLIGRAM(S): at 13:02

## 2023-02-26 RX ADMIN — MAGNESIUM OXIDE 400 MG ORAL TABLET 400 MILLIGRAM(S): 241.3 TABLET ORAL at 13:02

## 2023-02-26 RX ADMIN — Medication 25 MILLIGRAM(S): at 05:48

## 2023-02-26 RX ADMIN — Medication 2: at 09:03

## 2023-02-26 RX ADMIN — Medication 2: at 22:02

## 2023-02-26 RX ADMIN — Medication 325 MILLIGRAM(S): at 13:02

## 2023-02-26 RX ADMIN — ENOXAPARIN SODIUM 40 MILLIGRAM(S): 100 INJECTION SUBCUTANEOUS at 09:02

## 2023-02-26 RX ADMIN — MAGNESIUM OXIDE 400 MG ORAL TABLET 400 MILLIGRAM(S): 241.3 TABLET ORAL at 09:02

## 2023-02-26 RX ADMIN — Medication 1 TABLET(S): at 13:02

## 2023-02-26 RX ADMIN — ATORVASTATIN CALCIUM 40 MILLIGRAM(S): 80 TABLET, FILM COATED ORAL at 22:00

## 2023-02-26 RX ADMIN — Medication 2: at 13:01

## 2023-02-26 RX ADMIN — Medication 25 MILLIGRAM(S): at 17:27

## 2023-02-26 NOTE — PROGRESS NOTE ADULT - SUBJECTIVE AND OBJECTIVE BOX
[facility]                                                         CARDIOLOGY AT [facility]                                                                  [address], [place] [address]                                                         Telephone: [phone]. Fax:[phone]                                                                             PROGRESS NOTE    Reason for follow up: NSTEMI  Update: MRI neg      Review of symptoms:   Cardiac:  No chest pain. No dyspnea. No palpitations.  Respiratory: no cough. No dyspnea  Gastrointestinal: No diarrhea. No abdominal pain. No bleeding.   Neuro: No focal neuro complaints.    Vitals:  T(C): 36.7 (02-26-23 @ 08:17), Max: 36.9 (02-25-23 @ 20:30)  HR: 76 (02-26-23 @ 08:17) (73 - 80)  BP: 114/79 (02-26-23 @ 08:17) (111/74 - 168/100)  RR: 16 (02-26-23 @ 08:17) (15 - 18)  SpO2: 97% (02-26-23 @ 08:17) (97% - 99%)  Wt(kg): --  I&O's Summary    25 Feb 2023 07:01  -  26 Feb 2023 07:00  --------------------------------------------------------  IN: 440 mL / OUT: 600 mL / NET: -160 mL          PHYSICAL EXAM:  Appearance: Comfortable. No acute distress  HEENT:  Atraumatic. Normocephalic.  Normal oral mucosa  Neurologic: A & O x 3, no gross focal deficits.  Cardiovascular: RRR S1 S2, No murmur, no rubs/gallops. No JVD  Respiratory: Lungs clear to auscultation, unlabored   Gastrointestinal:  Soft, Non-tender, + BS  Lower Extremities: 2+ Peripheral Pulses, No clubbing, cyanosis, or edema  Psychiatry: Patient is calm. No agitation.   Skin: warm and dry.    CURRENT CARDIAC MEDICATIONS:  metoprolol tartrate 25 milliGRAM(s) Oral two times a day      CURRENT OTHER MEDICATIONS:  acetaminophen     Tablet .. 650 milliGRAM(s) Oral every 6 hours PRN Temp greater or equal to 38C (100.4F), Mild Pain (1 - 3), Moderate Pain (4 - 6)  atorvastatin 40 milliGRAM(s) Oral at bedtime  dextrose 50% Injectable 25 Gram(s) IV Push once, Stop order after: 1 Doses  dextrose 50% Injectable 12.5 Gram(s) IV Push once, Stop order after: 1 Doses  dextrose 50% Injectable 25 Gram(s) IV Push once, Stop order after: 1 Doses  dextrose Oral Gel 15 Gram(s) Oral once, Stop order after: 1 Doses PRN Blood Glucose LESS THAN 70 milliGRAM(s)/deciliter  glucagon  Injectable 1 milliGRAM(s) IntraMuscular once, Stop order after: 1 Doses  insulin glargine Injectable (LANTUS) 5 Unit(s) SubCutaneous every morning  insulin lispro (ADMELOG) corrective regimen sliding scale   SubCutaneous Before meals and at bedtime  aspirin enteric coated 81 milliGRAM(s) Oral daily  dextrose 5%. 1000 milliLiter(s) (50 mL/Hr) IV Continuous <Continuous>  dextrose 5%. 1000 milliLiter(s) (100 mL/Hr) IV Continuous <Continuous>  enoxaparin Injectable 40 milliGRAM(s) SubCutaneous every 24 hours  ferrous    sulfate 325 milliGRAM(s) Oral daily  influenza   Vaccine 0.5 milliLiter(s) IntraMuscular once  magnesium oxide 400 milliGRAM(s) Oral three times a day with meals, Stop order after: 2 Days  multivitamin 1 Tablet(s) Oral daily      LABS:	 	  ( 23 Feb 2023 09:20 )  Troponin T  0.17<H>,  CPK  46   , CKMB  X    , BNP X        , ( 21 Feb 2023 06:53 )  Troponin T  0.14<H>,  CPK  35   , CKMB  X    , BNP X        , ( 21 Feb 2023 00:36 )  Troponin T  0.15<H>,  CPK  40   , CKMB  X    , BNP X                                  8.7    7.59  )-----------( 480      ( 25 Feb 2023 05:37 )             27.3     02-25    134<L>  |  98  |  19.1  ----------------------------<  198<H>  4.7   |  23.0  |  0.74    Ca    8.6      25 Feb 2023 05:37  Mg     1.7     02-25      PT/INR/PTT ( 23 Feb 2023 10:30 )                       :                       :      14.6         :       41.0                  .        .                   .              .           .       1.26        .                                       Lipid Profile: Date: 02-21 @ 06:53  Total cholesterol 78; Direct LDL: --; HDL: 20; Triglycerides:143    HgA1c:   TSH: Thyroid Stimulating Hormone, Serum: 1.45 uIU/mL      TELEMETRY:       DIAGNOSTIC TESTING:  [ ] Echocardiogram:   < from: TTE Echo Complete w/ Contrast w/ Doppler (02.21.23 @ 16:56) >  PHYSICIAN INTERPRETATION:  Left Ventricle: The left ventricular internal cavity size is normal. Left   ventricular wall thickness is normal.  Global LV systolic function was normal. Left ventricular ejection   fraction, by visual estimation, is 65 to 70%. Spectral Doppler shows   normal pattern of LV diastolic filling.  Right Ventricle: The right ventricular size is normal. RV systolic   function is normal.  Left Atrium: Normal left atrial size.  Right Atrium: Normal right atrial size.  Pericardium: There is no evidence of pericardial effusion.  Mitral Valve: The mitral valve is normal in structure. Mitral leaflet   mobility is normal. Mild mitral valve regurgitation is seen.  Tricuspid Valve: The tricuspid valve is normal in structure. Trivial   tricuspid regurgitation is visualized.  Aortic Valve: The aortic valve is trileaflet. No evidence of aortic   stenosis. No evidence of aortic valve regurgitation is seen.  Pulmonic Valve: The pulmonic valve is normal. Trace pulmonic valve   regurgitation.  Aorta: The aortic root is normal in size and structure.  Pulmonary Artery: The pulmonary artery is of normal size and origin.  Venous: The inferior vena cava was normal sized, with respiratory size   variation greater than 50%.    < end of copied text >

## 2023-02-26 NOTE — PROGRESS NOTE ADULT - PROBLEM SELECTOR PLAN 1
-Elevated trops. Normal CK  -Echo: 65-70%  -EKG changing. Originally had TWI lateral, anterior, inferior on 2/21. On 2/23 it all resolved  -Ischemic eval was complicated by change in neuro status / anemia. Since then MRI completed, no acute findings. Will need neuro clearance prior to LHC  -Overnight compliant of dull midsternal / left anterior chest wall (above nipple) discomfort associated with palpitations.  Happened 2-3 times. Self resolved. No events on tele  -GDMT: metoprolol, ASA, statin    -Needs LHC, pend neuro clearance -Elevated trops. Normal CK  -Echo: 65-70%  -EKG changing. Originally had TWI lateral, anterior, inferior on 2/21. On 2/23 it all resolved  -Ischemic eval was complicated by change in neuro status / anemia. Since then MRI completed, no acute findings.   -Overnight compliant of dull midsternal / left anterior chest wall (above nipple) discomfort associated with palpitations.  Happened 2-3 times. Self resolved. No events on tele  -GDMT: metoprolol, ASA, statin    -[facility] 2/27. MRI head neg

## 2023-02-26 NOTE — PROGRESS NOTE ADULT - PROBLEM SELECTOR PLAN 2
-Had acute neuro symptoms (lightheaded, tunnel vision, LOC, right sided deficits). Code stroke. MRI head noted  -Appreciate neuro noted  -Has been experiencing dizziness for 2 weeks prior to hospitalization. Prior to then has been having intermittent similar episodes. -Had acute neuro symptoms (lightheaded, tunnel vision, LOC, right sided deficits). Code stroke. MRI head noted  -Appreciate neuro noted  -Has been experiencing dizziness for 2 weeks prior to hospitalization. Prior to then has been having intermittent similar episodes.  -[facility] outpatient

## 2023-02-26 NOTE — PROGRESS NOTE ADULT - SUBJECTIVE AND OBJECTIVE BOX
Non-ST elevation myocardial infarction (NSTEMI)    HPI:  47yoM hx DM, on insulin, who was sent from an outpatient clinical for near syncopal event.  Pt states he was going to clinical for routine visit, was in the waiting area when he had acute onset of lightheadedness, generalized weakness and almost passed out. Pt reports few episodes of lightheadedness and near syncopal events throughout the week. Pt denies nausea, vomiting, diarrhea and states his appetite has been ‘okay’.  He reports he had been constipated in the week but had a bowel movement while in the ED.  While in ED, pt also with 2 episodes of hypotension that occurred upon standing.  Admission labs notable for elevated troponin. (20 Feb 2023 23:49)    Interval History:  Patient was seen and examined at bedside around 11 am.  Episode of palpitation overnight.   Now feels better.   Denies chest pain, palpitations, shortness of breath, nausea, vomiting or abdominal pain.    ROS:  As per interval history otherwise unremarkable.    PHYSICAL EXAM:  Vital Signs   T(C): 36.7 (26 Feb 2023 08:17), Max: 36.9 (25 Feb 2023 20:30)  T(F): 98 (26 Feb 2023 08:17), Max: 98.4 (25 Feb 2023 20:30)  HR: 78 (26 Feb 2023 13:07) (73 - 80)  BP: 127/90 (26 Feb 2023 13:07) (114/79 - 168/100)  RR: 16 (26 Feb 2023 08:17) (15 - 18)  SpO2: 97% (26 Feb 2023 08:17) (97% - 99%)  Parameters below as of 26 Feb 2023 08:17  Patient On (Oxygen Delivery Method): room air  General: Young male sitting in bed comfortably. No acute distress. Cachectic.   HEENT: Cataract in right eye. EOMI. Clear conjunctivae. Moist mucus membrane  Neck: Supple.   Chest: CTA bilaterally. No wheezing, rales or rhonchi. No chest wall tenderness.  Heart: Normal S1 & S2. RRR.   Abdomen: Non distended. Soft. Non-tender. + BS  Ext: No pedal edema. No calf tenderness. 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 Right hand contracted.    Neuro: AAO x 3. No focal deficit. No speech disorder  Skin: Warm and Dry. Chronic scars in right forearm/hand and right thigh.   Psychiatry: Normal mood and affect    I&O's Summary    25 Feb 2023 07:01  -  26 Feb 2023 07:00  --------------------------------------------------------  IN: 440 mL / OUT: 600 mL / NET: -160 mL    LABS:  CAPILLARY BLOOD GLUCOSE  POCT Blood Glucose.: 223 mg/dL (26 Feb 2023 11:53)  POCT Blood Glucose.: 213 mg/dL (26 Feb 2023 08:53)  POCT Blood Glucose.: 207 mg/dL (25 Feb 2023 21:01)  POCT Blood Glucose.: 179 mg/dL (25 Feb 2023 17:27)                8.7    7.59  )-----------( 480      ( 25 Feb 2023 05:37 )             27.3     02-25    134<L>  |  98  |  19.1  ----------------------------<  198<H>  4.7   |  23.0  |  0.74    Ca    8.6      25 Feb 2023 05:37  Phos  3.2     02-24  Mg     1.7     02-25    TPro  6.9  /  Alb  2.6<L>  /  TBili  <0.2<L>  /  DBili  x   /  AST  5   /  ALT  <5  /  AlkPhos  133<H>  02-24    Blood Culture: 02-20 @ 16:55  Organism --  Gram Stain Blood -- Gram Stain --  Specimen Source .Blood Blood-Peripheral  Culture-Blood --    02-20 @ 16:45  Organism --  Gram Stain Blood -- Gram Stain --  Specimen Source .Blood Blood-Peripheral  Culture-Blood --    RADIOLOGY & ADDITIONAL STUDIES:  Reviewed     MEDICATIONS  (STANDING):  aspirin enteric coated 81 milliGRAM(s) Oral daily  atorvastatin 40 milliGRAM(s) Oral at bedtime  dextrose 5%. 1000 milliLiter(s) (50 mL/Hr) IV Continuous <Continuous>  dextrose 5%. 1000 milliLiter(s) (100 mL/Hr) IV Continuous <Continuous>  dextrose 50% Injectable 25 Gram(s) IV Push once  dextrose 50% Injectable 12.5 Gram(s) IV Push once  dextrose 50% Injectable 25 Gram(s) IV Push once  enoxaparin Injectable 40 milliGRAM(s) SubCutaneous every 24 hours  ferrous    sulfate 325 milliGRAM(s) Oral daily  glucagon  Injectable 1 milliGRAM(s) IntraMuscular once  influenza   Vaccine 0.5 milliLiter(s) IntraMuscular once  insulin glargine Injectable (LANTUS) 5 Unit(s) SubCutaneous every morning  insulin lispro (ADMELOG) corrective regimen sliding scale   SubCutaneous Before meals and at bedtime  metoprolol tartrate 25 milliGRAM(s) Oral two times a day  multivitamin 1 Tablet(s) Oral daily    MEDICATIONS  (PRN):  acetaminophen     Tablet .. 650 milliGRAM(s) Oral every 6 hours PRN Temp greater or equal to 38C (100.4F), Mild Pain (1 - 3), Moderate Pain (4 - 6)  dextrose Oral Gel 15 Gram(s) Oral once PRN Blood Glucose LESS THAN 70 milliGRAM(s)/deciliter

## 2023-02-26 NOTE — PROGRESS NOTE ADULT - PROBLEM SELECTOR PROBLEM 1
Non-ST elevation (NSTEMI) myocardial infarction

## 2023-02-26 NOTE — PROGRESS NOTE ADULT - ASSESSMENT
48 y/o Japanese speaking M w/ PMH of IDDM, chronic anemia (baseline Hb unknown), was sent from cardiology office for near syncopal event w/ episodes of hypotension while in ED upon standing and with labs notable for elevated troponins w/ EKG changes admitted for for further work up.  Trops have since trended down.  LHC postponed and heparin gtt d/c' in light of anemia.  Code stroke called on 2/23 due to acute onset Rt facial droop, rt sided weakness and slurred speech.  Pt upgraded to SDU.     1) Near Syncope   - Elevated Tropnin  - No CP at this time and tele w/out events   - Possibly demand ischemia from syncopal/hypotensive episodes  - Troponins down trending (0.23-->0.14)  - EKG showing new TWI in II, III, V4-V6 on 3rd EKG  - d/c'd heparin gtt in light of H/H trending down but will c/w ASA   - c/w statin therapy   - s/p 3L of LR on admission   - Continue monitoring on telem  - Cardiology following and recs noted: [facility] tomorrow     2) AMS w/ acute onset Rt sided deficits  - Code stroke called on 2/23   - Symptoms now completely resolved   - CT Head / Perfusion Scan / CTA Head & Neck with no acute findings   - MRI with no acute infarct   - VS and blood glucose during event nL   - Neuro consult noted   - Stroke ruled out     3) Hypovolemic Hyponatremic hypochloremia   - suspect due to poor po intake / dehydration    - Improved with IVF  - Encourage po intake     4) Normocytic Anemia  - Pt reports hx of anemia requiring blood transfusions in the past  - Does not know baseline hb or why he has anemia   - Clinically malnutrition which can contribute   - Iron panel noted   - B12/folate levels nL   - No reports of melena or hematochezia but elevated BUN/Cr ratio could be from dehydration but FOBT ordered    - Monitor CBC and transfuse for Hb<8     5) IDDM  - A1c 8.3  - Accu checks and ISS  - Added Lantus 5 units in morning     6) Severe protein calorie malnutrition   - Pt cachectic   - Has poor po intake and unintentional weight loss   - Will recommend outpatient work up with PMD for age appropriate screening   - Added MVI and Glucerna   - Nutrition eval noted     DVT Prophylaxis -- Lovenox SQ    Dispo: Home likely in 24-48 hours.

## 2023-02-26 NOTE — PROGRESS NOTE ADULT - NS ATTEND AMEND GEN_ALL_CORE FT
Non-ST elevation (NSTEMI) myocardial infarction.   Echo: 65-70%  EKG changing. Originally had TWI lateral, anterior, inferior on 2/21. On 2/23 it all resolved  LHC 2/27. MRI head neg.  Near syncope: [facility] outpatient.

## 2023-02-26 NOTE — PROGRESS NOTE ADULT - ASSESSMENT
48yo M w/ T2DM arrived by ambulance after experincing weakness and near syncope without chest pain or shortness of breath.  ECG no acute changes  Trop: 0.23 c/w NSTEMi. Episodes of dizziness / dull Chest discomfort / palpitations overnight

## 2023-02-27 ENCOUNTER — TRANSCRIPTION ENCOUNTER (OUTPATIENT)
Age: 48
End: 2023-02-27

## 2023-02-27 VITALS
OXYGEN SATURATION: 97 % | SYSTOLIC BLOOD PRESSURE: 107 MMHG | RESPIRATION RATE: 17 BRPM | DIASTOLIC BLOOD PRESSURE: 71 MMHG | HEART RATE: 84 BPM | TEMPERATURE: 99 F

## 2023-02-27 LAB
ALBUMIN SERPL ELPH-MCNC: 2.8 G/DL — LOW (ref 3.3–5.2)
ALP SERPL-CCNC: 119 U/L — SIGNIFICANT CHANGE UP (ref 40–120)
ALT FLD-CCNC: <5 U/L — SIGNIFICANT CHANGE UP
ANION GAP SERPL CALC-SCNC: 11 MMOL/L — SIGNIFICANT CHANGE UP (ref 5–17)
AST SERPL-CCNC: 11 U/L — SIGNIFICANT CHANGE UP
BILIRUB SERPL-MCNC: <0.2 MG/DL — LOW (ref 0.4–2)
BUN SERPL-MCNC: 23 MG/DL — HIGH (ref 8–20)
CALCIUM SERPL-MCNC: 8.8 MG/DL — SIGNIFICANT CHANGE UP (ref 8.4–10.5)
CHLORIDE SERPL-SCNC: 96 MMOL/L — SIGNIFICANT CHANGE UP (ref 96–108)
CO2 SERPL-SCNC: 25 MMOL/L — SIGNIFICANT CHANGE UP (ref 22–29)
CREAT SERPL-MCNC: 0.96 MG/DL — SIGNIFICANT CHANGE UP (ref 0.5–1.3)
EGFR: 98 ML/MIN/1.73M2 — SIGNIFICANT CHANGE UP
GLUCOSE BLDC GLUCOMTR-MCNC: 157 MG/DL — HIGH (ref 70–99)
GLUCOSE BLDC GLUCOMTR-MCNC: 183 MG/DL — HIGH (ref 70–99)
GLUCOSE BLDC GLUCOMTR-MCNC: 212 MG/DL — HIGH (ref 70–99)
GLUCOSE SERPL-MCNC: 189 MG/DL — HIGH (ref 70–99)
HCT VFR BLD CALC: 26.4 % — LOW (ref 39–50)
HGB BLD-MCNC: 8.6 G/DL — LOW (ref 13–17)
MAGNESIUM SERPL-MCNC: 1.8 MG/DL — SIGNIFICANT CHANGE UP (ref 1.6–2.6)
MCHC RBC-ENTMCNC: 27.2 PG — SIGNIFICANT CHANGE UP (ref 27–34)
MCHC RBC-ENTMCNC: 32.6 GM/DL — SIGNIFICANT CHANGE UP (ref 32–36)
MCV RBC AUTO: 83.5 FL — SIGNIFICANT CHANGE UP (ref 80–100)
PLATELET # BLD AUTO: 456 K/UL — HIGH (ref 150–400)
POTASSIUM SERPL-MCNC: 5.1 MMOL/L — SIGNIFICANT CHANGE UP (ref 3.5–5.3)
POTASSIUM SERPL-SCNC: 5.1 MMOL/L — SIGNIFICANT CHANGE UP (ref 3.5–5.3)
PROT SERPL-MCNC: 7.3 G/DL — SIGNIFICANT CHANGE UP (ref 6.6–8.7)
RBC # BLD: 3.16 M/UL — LOW (ref 4.2–5.8)
RBC # FLD: 14.6 % — HIGH (ref 10.3–14.5)
SODIUM SERPL-SCNC: 132 MMOL/L — LOW (ref 135–145)
WBC # BLD: 8.41 K/UL — SIGNIFICANT CHANGE UP (ref 3.8–10.5)
WBC # FLD AUTO: 8.41 K/UL — SIGNIFICANT CHANGE UP (ref 3.8–10.5)

## 2023-02-27 PROCEDURE — C8929: CPT

## 2023-02-27 PROCEDURE — 85014 HEMATOCRIT: CPT

## 2023-02-27 PROCEDURE — 82607 VITAMIN B-12: CPT

## 2023-02-27 PROCEDURE — 85025 COMPLETE CBC W/AUTO DIFF WBC: CPT

## 2023-02-27 PROCEDURE — 71045 X-RAY EXAM CHEST 1 VIEW: CPT

## 2023-02-27 PROCEDURE — 93458 L HRT ARTERY/VENTRICLE ANGIO: CPT | Mod: 26

## 2023-02-27 PROCEDURE — 99233 SBSQ HOSP IP/OBS HIGH 50: CPT

## 2023-02-27 PROCEDURE — 80053 COMPREHEN METABOLIC PANEL: CPT

## 2023-02-27 PROCEDURE — U0003: CPT

## 2023-02-27 PROCEDURE — 99232 SBSQ HOSP IP/OBS MODERATE 35: CPT

## 2023-02-27 PROCEDURE — 85730 THROMBOPLASTIN TIME PARTIAL: CPT

## 2023-02-27 PROCEDURE — 82435 ASSAY OF BLOOD CHLORIDE: CPT

## 2023-02-27 PROCEDURE — 99239 HOSP IP/OBS DSCHRG MGMT >30: CPT

## 2023-02-27 PROCEDURE — C1894: CPT

## 2023-02-27 PROCEDURE — 83550 IRON BINDING TEST: CPT

## 2023-02-27 PROCEDURE — 80048 BASIC METABOLIC PNL TOTAL CA: CPT

## 2023-02-27 PROCEDURE — 86901 BLOOD TYPING SEROLOGIC RH(D): CPT

## 2023-02-27 PROCEDURE — 99152 MOD SED SAME PHYS/QHP 5/>YRS: CPT

## 2023-02-27 PROCEDURE — C1769: CPT

## 2023-02-27 PROCEDURE — 70551 MRI BRAIN STEM W/O DYE: CPT

## 2023-02-27 PROCEDURE — 83935 ASSAY OF URINE OSMOLALITY: CPT

## 2023-02-27 PROCEDURE — 36415 COLL VENOUS BLD VENIPUNCTURE: CPT

## 2023-02-27 PROCEDURE — 83540 ASSAY OF IRON: CPT

## 2023-02-27 PROCEDURE — 84295 ASSAY OF SERUM SODIUM: CPT

## 2023-02-27 PROCEDURE — T1013: CPT

## 2023-02-27 PROCEDURE — 83605 ASSAY OF LACTIC ACID: CPT

## 2023-02-27 PROCEDURE — 70496 CT ANGIOGRAPHY HEAD: CPT

## 2023-02-27 PROCEDURE — 82962 GLUCOSE BLOOD TEST: CPT

## 2023-02-27 PROCEDURE — 85610 PROTHROMBIN TIME: CPT

## 2023-02-27 PROCEDURE — 80061 LIPID PANEL: CPT

## 2023-02-27 PROCEDURE — 83036 HEMOGLOBIN GLYCOSYLATED A1C: CPT

## 2023-02-27 PROCEDURE — 86850 RBC ANTIBODY SCREEN: CPT

## 2023-02-27 PROCEDURE — 82550 ASSAY OF CK (CPK): CPT

## 2023-02-27 PROCEDURE — 86900 BLOOD TYPING SEROLOGIC ABO: CPT

## 2023-02-27 PROCEDURE — 82728 ASSAY OF FERRITIN: CPT

## 2023-02-27 PROCEDURE — 99291 CRITICAL CARE FIRST HOUR: CPT

## 2023-02-27 PROCEDURE — 85018 HEMOGLOBIN: CPT

## 2023-02-27 PROCEDURE — 84466 ASSAY OF TRANSFERRIN: CPT

## 2023-02-27 PROCEDURE — 82947 ASSAY GLUCOSE BLOOD QUANT: CPT

## 2023-02-27 PROCEDURE — 82330 ASSAY OF CALCIUM: CPT

## 2023-02-27 PROCEDURE — 84443 ASSAY THYROID STIM HORMONE: CPT

## 2023-02-27 PROCEDURE — 70450 CT HEAD/BRAIN W/O DYE: CPT

## 2023-02-27 PROCEDURE — 84132 ASSAY OF SERUM POTASSIUM: CPT

## 2023-02-27 PROCEDURE — 70498 CT ANGIOGRAPHY NECK: CPT

## 2023-02-27 PROCEDURE — U0005: CPT

## 2023-02-27 PROCEDURE — 82746 ASSAY OF FOLIC ACID SERUM: CPT

## 2023-02-27 PROCEDURE — 84100 ASSAY OF PHOSPHORUS: CPT

## 2023-02-27 PROCEDURE — 82803 BLOOD GASES ANY COMBINATION: CPT

## 2023-02-27 PROCEDURE — 93005 ELECTROCARDIOGRAM TRACING: CPT

## 2023-02-27 PROCEDURE — 82570 ASSAY OF URINE CREATININE: CPT

## 2023-02-27 PROCEDURE — 87040 BLOOD CULTURE FOR BACTERIA: CPT

## 2023-02-27 PROCEDURE — 85027 COMPLETE CBC AUTOMATED: CPT

## 2023-02-27 PROCEDURE — 93458 L HRT ARTERY/VENTRICLE ANGIO: CPT

## 2023-02-27 PROCEDURE — 84300 ASSAY OF URINE SODIUM: CPT

## 2023-02-27 PROCEDURE — 0042T: CPT

## 2023-02-27 PROCEDURE — 93010 ELECTROCARDIOGRAM REPORT: CPT

## 2023-02-27 PROCEDURE — 84484 ASSAY OF TROPONIN QUANT: CPT

## 2023-02-27 PROCEDURE — 83735 ASSAY OF MAGNESIUM: CPT

## 2023-02-27 PROCEDURE — C1887: CPT

## 2023-02-27 RX ORDER — ATORVASTATIN CALCIUM 80 MG/1
1 TABLET, FILM COATED ORAL
Qty: 30 | Refills: 0
Start: 2023-02-27 | End: 2023-03-28

## 2023-02-27 RX ORDER — FERROUS SULFATE 325(65) MG
1 TABLET ORAL
Qty: 30 | Refills: 0
Start: 2023-02-27 | End: 2023-03-28

## 2023-02-27 RX ORDER — ASPIRIN/CALCIUM CARB/MAGNESIUM 324 MG
1 TABLET ORAL
Qty: 30 | Refills: 0
Start: 2023-02-27 | End: 2023-03-28

## 2023-02-27 RX ORDER — METOPROLOL TARTRATE 50 MG
12.5 TABLET ORAL EVERY 12 HOURS
Refills: 0 | Status: DISCONTINUED | OUTPATIENT
Start: 2023-02-27 | End: 2023-02-27

## 2023-02-27 RX ORDER — METOPROLOL TARTRATE 50 MG
0.5 TABLET ORAL
Qty: 30 | Refills: 0
Start: 2023-02-27 | End: 2023-03-28

## 2023-02-27 RX ORDER — SODIUM CHLORIDE 9 MG/ML
250 INJECTION INTRAMUSCULAR; INTRAVENOUS; SUBCUTANEOUS ONCE
Refills: 0 | Status: DISCONTINUED | OUTPATIENT
Start: 2023-02-27 | End: 2023-02-27

## 2023-02-27 RX ADMIN — Medication 1 TABLET(S): at 17:53

## 2023-02-27 RX ADMIN — Medication 81 MILLIGRAM(S): at 12:06

## 2023-02-27 RX ADMIN — Medication 1: at 08:42

## 2023-02-27 RX ADMIN — Medication 325 MILLIGRAM(S): at 11:58

## 2023-02-27 RX ADMIN — Medication 25 MILLIGRAM(S): at 05:40

## 2023-02-27 RX ADMIN — Medication 12.5 MILLIGRAM(S): at 17:53

## 2023-02-27 RX ADMIN — Medication 2: at 17:52

## 2023-02-27 NOTE — DISCHARGE NOTE PROVIDER - NSDCMRMEDTOKEN_GEN_ALL_CORE_FT
aspirin 81 mg oral delayed release tablet: 1 tab(s) orally once a day  atorvastatin 40 mg oral tablet: 1 tab(s) orally once a day (at bedtime)  ferrous sulfate 325 mg (65 mg elemental iron) oral tablet: 1 tab(s) orally once a day  insulin: 10 unit(s) subcutaneous once a day  metoprolol tartrate 25 mg oral tablet: 0.5 tab(s) orally 2 times a day   Multiple Vitamins oral tablet: 1 tab(s) orally once a day

## 2023-02-27 NOTE — DISCHARGE NOTE PROVIDER - ATTENDING DISCHARGE PHYSICAL EXAMINATION:
Vital Signs   T(C): 36.8 (27 Feb 2023 16:30), Max: 37 (26 Feb 2023 16:51)  T(F): 98.2 (27 Feb 2023 16:30), Max: 98.6 (26 Feb 2023 16:51)  HR: 71 (27 Feb 2023 16:30) (71 - 79)  BP: 138/92 (27 Feb 2023 16:30) (92/55 - 145/91)  RR: 18 (27 Feb 2023 16:30) (12 - 18)  SpO2: 96% (27 Feb 2023 16:30) (96% - 100%)  Parameters below as of 27 Feb 2023 16:30  Patient On (Oxygen Delivery Method): room air  General: Young male sitting in bed comfortably. No acute distress. Cachectic.   HEENT: Cataract in right eye. EOMI. Clear conjunctivae. Moist mucus membrane  Neck: Supple.   Chest: CTA bilaterally. No wheezing, rales or rhonchi. No chest wall tenderness.  Heart: Normal S1 & S2. RRR.   Abdomen: Non distended. Soft. Non-tender. + BS  Ext: No pedal edema. No calf tenderness. 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 Right hand contracted.    Neuro: AAO x 3. No focal deficit. No speech disorder  Skin: Warm and Dry. Chronic scars in right forearm/hand and right thigh.   Psychiatry: Normal mood and affect

## 2023-02-27 NOTE — PROGRESS NOTE ADULT - ASSESSMENT
46yo M w/ T2DM arrived by ambulance after experincing weakness and near syncope without chest pain or shortness of breath.    ECG no acute changes  Trop: 0.23 c/w NSTEMi. Episodes of dizziness / dull Chest discomfort / palpitations overnight     Problem/Plan - 1:  ·  Problem: Non-ST elevation (NSTEMI) myocardial infarction.   ·  Elevated trops./  Normal CK  -Echo: 65-70%  -Ischemic eval was complicated by change in neuro status / anemia. Since then MRI completed, no acute findings.   -GDMT: metoprolol, ASA, statin consider adding low dose ace ARB if bp permits   holding now as BP still marginal   hemodynamically stable without chest pain or SOB   - pending [facility] 2/27.   d/w Dr Miller     Problem/Plan - 2:  ·  Problem: Near syncope.   ·  Plan: -Had acute neuro symptoms (lightheaded, tunnel vision, LOC, right sided deficits). Code stroke. MRI head noted  -Appreciate neuro noted  -Has been experiencing dizziness for 2 weeks prior to hospitalization. Prior to then has been having intermittent similar episodes.  -[facility] outpatient.

## 2023-02-27 NOTE — PROGRESS NOTE ADULT - ASSESSMENT
ASSESSMENT:  47yoM hx DM, on insulin, who was sent from an outpatient clinical for near syncopal event. While in ED, pt also with 2 episodes of hypotension that occurred upon standing. Pt. was admitted for evaluation and etiology of syncopal episodes. Code stroke called 0907 AM 2/23/23 NIH 4 with right facial droop and right side weakness. Stroke team assessment at 0925 NIH 2 with left  facial palsy. CTA without evidence of an acute transcortical infarction or hemorrhage. No large vessel occlusion.      NEURO: Continue close monitoring for neurologic deterioration, with neuro checks Q 2hours, permissive HTN -140 mmHg, check BP for orthostatic hypotension. CTA without evidence of infarct, hemorrhage, or a large vessel occlusion.  MRI Brain did not show acute stroke. Titrate statin to LDL goal less than 70, Physical therapy/OT/Speech eval/treatment.     ANTITHROMBOTIC THERAPY: ASA 81 mg daily     PULMONARY: CXR, protecting airway, saturating well on room air    CARDIOVASCULAR: check TTE, cardiac monitoring, for cardiac cath                            GASTROINTESTINAL:  dysphagia screen  passed. Nutritional consult     Diet: DASH diet as tolerated,     RENAL: BUN/Cr 24.9/1.08, monitor urine output, keep hydrated as tolerated      Na Goal:  135-145     Hernandez: N    HEMATOLOGY: H/H 8.9/27.1, Platelets 549, all age appropriate and malignancy screening to determine resent unintentional weight loss (20 lb in 2 month)     DVT ppx: Heparin s.c [] LMWH [x]     ID: afebrile, no leukocytosis, monitor for signs of infection    OTHER: SW and nutritionist consult    DISPOSITION: Rehab or home depending on PT eval once stable and workup is complete      CORE MEASURES:        Admission NIHSS: 4     TPA: [] YES [x] NO      LDL/HDL/A1C: 29/58/8.3     Depression Screen: pending     Statin Therapy: Atorvastatin 40 mg daily     Dysphagia Screen: [x] PASS [] FAIL     Smoking [] YES [x] NO      Afib [] YES [] NO     Stroke Education [x] YES [] NO    Thank you for allowing me to participate in the care of your patient    Stephen Bernardo MD, PhD   [id number]

## 2023-02-27 NOTE — PROGRESS NOTE ADULT - SUBJECTIVE AND OBJECTIVE BOX
Now s/p LHC via right femoral with angioseal in place . Procedure performed by Dr. Chung.  Pt arrived to recovery in NAD and HDS.  RFA access site stable, no bleed/hematoma, distal pulse +.    Procedure results: S/P LHC which revealed mod dLAD disease to be treated medically    Medication received during procedure:  Versed: 0  Fentanyl: 0  Heparin: 0  Omnipaque: 79 ml    Exam:   Neuro: A&O X.  SANDOVAL=  CV: RRR  Lungs: CTA  Ext: + palp pulses.  Vascular access: RRA with angioseal closure device.  Site stable. No bleeding/hematoma/ecchymosis.  + cap refill     Plan:  -Formal cath report pending  -Post procedure management/monitoring per protocol  -Access site precautions  -Bedrest x 2hours post procedure  -Labs and EKG in am  -Repeat ECG if any clinical indication or change on tele  -NS 250mL bolus post cath   -Continue current medical therapy  -F/U outpt in 1-2 weeks with Cardiologist Dr. Neely  -Site check in AM  -Transfer back to bed when criteria met

## 2023-02-27 NOTE — DISCHARGE NOTE PROVIDER - PROVIDER TOKENS
PROVIDER:[TOKEN:[[id number]],FOLLOWUP:[2 weeks]],FREE:[LAST:[PMD - [facility]],PHONE:[phone]],FAX:[(   )    -],ADDRESS:[[address] [place] [address]],FOLLOWUP:[1 week],ESTABLISHEDPATIENT:[T]],FREE:[LAST:[Endocrinology],PHONE:[(   )    -],FAX:[(   )    -],FOLLOWUP:[1 month],ESTABLISHEDPATIENT:[T]]

## 2023-02-27 NOTE — PROGRESS NOTE ADULT - SUBJECTIVE AND OBJECTIVE BOX
Non-ST elevation myocardial infarction (NSTEMI)    HPI:  47yoM hx DM, on insulin, who was sent from an outpatient clinical for near syncopal event.  Pt states he was going to clinical for routine visit, was in the waiting area when he had acute onset of lightheadedness, generalized weakness and almost passed out. Pt reports few episodes of lightheadedness and near syncopal events throughout the week. Pt denies nausea, vomiting, diarrhea and states his appetite has been ‘okay’.  He reports he had been constipated in the week but had a bowel movement while in the ED.  While in ED, pt also with 2 episodes of hypotension that occurred upon standing.  Admission labs notable for elevated troponin. (20 Feb 2023 23:49)    Interval History:  Patient was seen and examined around 11:45 am in cath lab.   Feels better.   Denies chest pain, palpitations, shortness of breath, nausea, vomiting or abdominal pain.    ROS:  As per interval history otherwise unremarkable.    PHYSICAL EXAM:  Vital Signs   T(C): 36.8 (27 Feb 2023 16:30), Max: 37 (27 Feb 2023 07:45)  T(F): 98.2 (27 Feb 2023 16:30), Max: 98.6 (27 Feb 2023 07:45)  HR: 71 (27 Feb 2023 16:30) (71 - 79)  BP: 138/92 (27 Feb 2023 16:30) (92/55 - 144/85)  RR: 18 (27 Feb 2023 16:30) (12 - 18)  SpO2: 96% (27 Feb 2023 16:30) (96% - 100%)  Parameters below as of 27 Feb 2023 16:30  Patient On (Oxygen Delivery Method): room air  General: Young male sitting in bed comfortably. No acute distress. Cachectic.   HEENT: Cataract in right eye. EOMI. Clear conjunctivae. Moist mucus membrane  Neck: Supple.   Chest: CTA bilaterally. No wheezing, rales or rhonchi. No chest wall tenderness.  Heart: Normal S1 & S2. RRR.   Abdomen: Non distended. Soft. Non-tender. + BS  Ext: No pedal edema. No calf tenderness. 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 Right hand contracted.    Neuro: AAO x 3. No focal deficit. No speech disorder  Skin: Warm and Dry. Chronic scars in right forearm/hand and right thigh.   Psychiatry: Normal mood and affect    I&O's Summary    27 Feb 2023 07:01  -  27 Feb 2023 17:32  --------------------------------------------------------  IN: 240 mL / OUT: 0 mL / NET: 240 mL    LABS:  CAPILLARY BLOOD GLUCOSE  POCT Blood Glucose.: 212 mg/dL (27 Feb 2023 17:07)  POCT Blood Glucose.: 157 mg/dL (27 Feb 2023 12:01)  POCT Blood Glucose.: 183 mg/dL (27 Feb 2023 08:13)  POCT Blood Glucose.: 250 mg/dL (26 Feb 2023 21:46)              8.7    7.59  )-----------( 480      ( 25 Feb 2023 05:37 )             27.3     02-25    134<L>  |  98  |  19.1  ----------------------------<  198<H>  4.7   |  23.0  |  0.74    Ca    8.6      25 Feb 2023 05:37  Phos  3.2     02-24  Mg     1.7     02-25    TPro  6.9  /  Alb  2.6<L>  /  TBili  <0.2<L>  /  DBili  x   /  AST  5   /  ALT  <5  /  AlkPhos  133<H>  02-24    Blood Culture: 02-20 @ 16:55  Organism --  Gram Stain Blood -- Gram Stain --  Specimen Source .Blood Blood-Peripheral  Culture-Blood --    02-20 @ 16:45  Organism --  Gram Stain Blood -- Gram Stain --  Specimen Source .Blood Blood-Peripheral  Culture-Blood --    RADIOLOGY & ADDITIONAL STUDIES:  Reviewed     MEDICATIONS  (STANDING):  aspirin enteric coated 81 milliGRAM(s) Oral daily  atorvastatin 40 milliGRAM(s) Oral at bedtime  dextrose 5%. 1000 milliLiter(s) (100 mL/Hr) IV Continuous <Continuous>  dextrose 5%. 1000 milliLiter(s) (50 mL/Hr) IV Continuous <Continuous>  dextrose 50% Injectable 25 Gram(s) IV Push once  dextrose 50% Injectable 12.5 Gram(s) IV Push once  dextrose 50% Injectable 25 Gram(s) IV Push once  enoxaparin Injectable 40 milliGRAM(s) SubCutaneous every 24 hours  ferrous    sulfate 325 milliGRAM(s) Oral daily  glucagon  Injectable 1 milliGRAM(s) IntraMuscular once  influenza   Vaccine 0.5 milliLiter(s) IntraMuscular once  insulin glargine Injectable (LANTUS) 5 Unit(s) SubCutaneous every morning  insulin lispro (ADMELOG) corrective regimen sliding scale   SubCutaneous Before meals and at bedtime  multivitamin 1 Tablet(s) Oral daily  sodium chloride 0.9% Bolus 250 milliLiter(s) IV Bolus once    MEDICATIONS  (PRN):  acetaminophen     Tablet .. 650 milliGRAM(s) Oral every 6 hours PRN Temp greater or equal to 38C (100.4F), Mild Pain (1 - 3), Moderate Pain (4 - 6)  dextrose Oral Gel 15 Gram(s) Oral once PRN Blood Glucose LESS THAN 70 milliGRAM(s)/deciliter

## 2023-02-27 NOTE — PROGRESS NOTE ADULT - SUBJECTIVE AND OBJECTIVE BOX
[facility]                                                         CARDIOLOGY AT [facility]                                                                  [address], [place] [address]                                                         Telephone: [phone]. Fax:[phone]                                                                             PROGRESS NOTE    Reason for follow up: NSTEMI  Update: "feels ok " denies complaints of chest pain/sob/dizziness/palps   NPO for [facility] today      Review of symptoms:   Cardiac:  No chest pain. No dyspnea. No palpitations.  Respiratory: no cough. No dyspnea  Gastrointestinal: No diarrhea. No abdominal pain. No bleeding.   Neuro: No focal neuro complaints.    Vitals:  T(C): 36.8 (02-27-23 @ 11:24), Max: 37 (02-26-23 @ 16:51)  HR: 72 (02-27-23 @ 11:24) (72 - 79)  BP: 96/64 (02-27-23 @ 11:24) (92/55 - 145/91)  RR: 17 (02-27-23 @ 11:24) (15 - 18)  SpO2: 99% (02-27-23 @ 11:24) (98% - 99%)  Wt(kg): --  I&O's Summary    27 Feb 2023 07:01  -  27 Feb 2023 12:47  --------------------------------------------------------  IN: 240 mL / OUT: 0 mL / NET: 240 mL          PHYSICAL EXAM:  Appearance: Comfortable. No acute distress  HEENT:  Atraumatic. Normocephalic.  Normal oral mucosa  Neurologic: A & O x 3, no gross focal deficits.  Cardiovascular: RRR S1 S2,RRR 82  No murmur, no rubs/gallops. No JVD  Respiratory: Lungs clear to auscultation, unlabored   Gastrointestinal:  Soft, Non-tender, + BS  Lower Extremities: 2+ Peripheral Pulses, No clubbing, cyanosis, or edema  Psychiatry: Patient is calm. No agitation.   Skin: warm and dry.    CURRENT CARDIAC MEDICATIONS:  metoprolol tartrate 25 milliGRAM(s) Oral two times a day      CURRENT OTHER MEDICATIONS:  acetaminophen     Tablet .. 650 milliGRAM(s) Oral every 6 hours PRN Temp greater or equal to 38C (100.4F), Mild Pain (1 - 3), Moderate Pain (4 - 6)  atorvastatin 40 milliGRAM(s) Oral at bedtime  dextrose 50% Injectable 25 Gram(s) IV Push once, Stop order after: 1 Doses  dextrose 50% Injectable 12.5 Gram(s) IV Push once, Stop order after: 1 Doses  dextrose 50% Injectable 25 Gram(s) IV Push once, Stop order after: 1 Doses  dextrose Oral Gel 15 Gram(s) Oral once, Stop order after: 1 Doses PRN Blood Glucose LESS THAN 70 milliGRAM(s)/deciliter  glucagon  Injectable 1 milliGRAM(s) IntraMuscular once, Stop order after: 1 Doses  insulin glargine Injectable (LANTUS) 5 Unit(s) SubCutaneous every morning  insulin lispro (ADMELOG) corrective regimen sliding scale   SubCutaneous Before meals and at bedtime  aspirin enteric coated 81 milliGRAM(s) Oral daily  dextrose 5%. 1000 milliLiter(s) (100 mL/Hr) IV Continuous <Continuous>  dextrose 5%. 1000 milliLiter(s) (50 mL/Hr) IV Continuous <Continuous>  enoxaparin Injectable 40 milliGRAM(s) SubCutaneous every 24 hours  ferrous    sulfate 325 milliGRAM(s) Oral daily  influenza   Vaccine 0.5 milliLiter(s) IntraMuscular once  multivitamin 1 Tablet(s) Oral daily  sodium chloride 0.9% Bolus 250 milliLiter(s) IV Bolus once, Stop order after: 1 Doses      LABS:	 	  ( 23 Feb 2023 09:20 )  Troponin T  0.17<H>,  CPK  46   , CKMB  X    , BNP X        , ( 21 Feb 2023 06:53 )  Troponin T  0.14<H>,  CPK  35   , CKMB  X    , BNP X        , ( 21 Feb 2023 00:36 )  Troponin T  0.15<H>,  CPK  40   , CKMB  X    , BNP X                                  8.6    8.41  )-----------( 456      ( 27 Feb 2023 08:35 )             26.4     02-27    132<L>  |  96  |  23.0<H>  ----------------------------<  189<H>  5.1   |  25.0  |  0.96    Ca    8.8      27 Feb 2023 08:35  Mg     1.8     02-27    TPro  7.3  /  Alb  2.8<L>  /  TBili  <0.2<L>  /  DBili  x   /  AST  11  /  ALT  <5  /  AlkPhos  119  02-27    PT/INR/PTT ( 23 Feb 2023 10:30 )                       :                       :      14.6         :       41.0                  .        .                   .              .           .       1.26        .                                       Lipid Profile: Date: 02-21 @ 06:53  Total cholesterol 78; Direct LDL: --; HDL: 20; Triglycerides:143    HgA1c:   TSH: Thyroid Stimulating Hormone, Serum: 1.45 uIU/mL      TELEMETRY: RSR 80's no events    DIAGNOSTIC TESTING:   Echocardiogram:   < from: TTE Echo Complete w/ Contrast w/ Doppler (02.21.23 @ 16:56) >  PHYSICIAN INTERPRETATION:  Left Ventricle: The left ventricular internal cavity size is normal. Left   ventricular wall thickness is normal.  Global LV systolic function was normal. Left ventricular ejection   fraction, by visual estimation, is 65 to 70%. Spectral Doppler shows   normal pattern of LV diastolic filling.  Right Ventricle: The right ventricular size is normal. RV systolic   function is normal.  Left Atrium: Normal left atrial size.  Right Atrium: Normal right atrial size.  Pericardium: There is no evidence of pericardial effusion.  Mitral Valve: The mitral valve is normal in structure. Mitral leaflet   mobility is normal. Mild mitral valve regurgitation is seen.  Tricuspid Valve: The tricuspid valve is normal in structure. Trivial   tricuspid regurgitation is visualized.  Aortic Valve: The aortic valve is trileaflet. No evidence of aortic   stenosis. No evidence of aortic valve regurgitation is seen.  Pulmonic Valve: The pulmonic valve is normal. Trace pulmonic valve   regurgitation.  Aorta: The aortic root is normal in size and structure.  Pulmonary Artery: The pulmonary artery is of normal size and origin.  Venous: The inferior vena cava was normal sized, with respiratory size   variation greater than 50%.                                                              [facility]                                                         CARDIOLOGY AT [facility]                                                                  [address] [place] [address]                                                         Telephone: [phone]. Fax:[phone]                                                                             PROGRESS NOTE    Reason for follow up: NSTEMI  Update: "feels ok " denies complaints of chest pain/sob/dizziness/palps   NPO for [facility] today      Review of symptoms:   Cardiac:  No chest pain. No dyspnea. No palpitations.  Respiratory: no cough. No dyspnea  Gastrointestinal: No diarrhea. No abdominal pain. No bleeding.   Neuro: No focal neuro complaints.    Vitals:  T(C): 36.8 (02-27-23 @ 11:24), Max: 37 (02-26-23 @ 16:51)  HR: 72 (02-27-23 @ 11:24) (72 - 79)  BP: 96/64 (02-27-23 @ 11:24) (92/55 - 145/91)  RR: 17 (02-27-23 @ 11:24) (15 - 18)  SpO2: 99% (02-27-23 @ 11:24) (98% - 99%)  Wt(kg): --  I&O's Summary    27 Feb 2023 07:01  -  27 Feb 2023 12:47  --------------------------------------------------------  IN: 240 mL / OUT: 0 mL / NET: 240 mL          PHYSICAL EXAM: frail / cachectic   Appearance: Comfortable. No acute distress  HEENT:  Atraumatic. Normocephalic.  Normal oral mucosa  Neurologic: A & O x 3, no gross focal deficits.  Cardiovascular: RRR S1 S2,RRR 82  No murmur, no rubs/gallops. No JVD  Respiratory: Lungs clear to auscultation, unlabored   Gastrointestinal:  Soft, Non-tender, + BS  Lower Extremities: 2+ Peripheral Pulses, No clubbing, cyanosis, or edema  Psychiatry: Patient is calm. No agitation.   Skin: warm and dry.    CURRENT CARDIAC MEDICATIONS:  metoprolol tartrate 25 milliGRAM(s) Oral two times a day      CURRENT OTHER MEDICATIONS:  acetaminophen     Tablet .. 650 milliGRAM(s) Oral every 6 hours PRN Temp greater or equal to 38C (100.4F), Mild Pain (1 - 3), Moderate Pain (4 - 6)  atorvastatin 40 milliGRAM(s) Oral at bedtime  dextrose 50% Injectable 25 Gram(s) IV Push once, Stop order after: 1 Doses  dextrose 50% Injectable 12.5 Gram(s) IV Push once, Stop order after: 1 Doses  dextrose 50% Injectable 25 Gram(s) IV Push once, Stop order after: 1 Doses  dextrose Oral Gel 15 Gram(s) Oral once, Stop order after: 1 Doses PRN Blood Glucose LESS THAN 70 milliGRAM(s)/deciliter  glucagon  Injectable 1 milliGRAM(s) IntraMuscular once, Stop order after: 1 Doses  insulin glargine Injectable (LANTUS) 5 Unit(s) SubCutaneous every morning  insulin lispro (ADMELOG) corrective regimen sliding scale   SubCutaneous Before meals and at bedtime  aspirin enteric coated 81 milliGRAM(s) Oral daily  dextrose 5%. 1000 milliLiter(s) (100 mL/Hr) IV Continuous <Continuous>  dextrose 5%. 1000 milliLiter(s) (50 mL/Hr) IV Continuous <Continuous>  enoxaparin Injectable 40 milliGRAM(s) SubCutaneous every 24 hours  ferrous    sulfate 325 milliGRAM(s) Oral daily  influenza   Vaccine 0.5 milliLiter(s) IntraMuscular once  multivitamin 1 Tablet(s) Oral daily  sodium chloride 0.9% Bolus 250 milliLiter(s) IV Bolus once, Stop order after: 1 Doses      LABS:	 	  ( 23 Feb 2023 09:20 )  Troponin T  0.17<H>,  CPK  46   , CKMB  X    , BNP X        , ( 21 Feb 2023 06:53 )  Troponin T  0.14<H>,  CPK  35   , CKMB  X    , BNP X        , ( 21 Feb 2023 00:36 )  Troponin T  0.15<H>,  CPK  40   , CKMB  X    , BNP X                                  8.6    8.41  )-----------( 456      ( 27 Feb 2023 08:35 )             26.4     02-27    132<L>  |  96  |  23.0<H>  ----------------------------<  189<H>  5.1   |  25.0  |  0.96    Ca    8.8      27 Feb 2023 08:35  Mg     1.8     02-27    TPro  7.3  /  Alb  2.8<L>  /  TBili  <0.2<L>  /  DBili  x   /  AST  11  /  ALT  <5  /  AlkPhos  119  02-27    PT/INR/PTT ( 23 Feb 2023 10:30 )                       :                       :      14.6         :       41.0                  .        .                   .              .           .       1.26        .                                       Lipid Profile: Date: 02-21 @ 06:53  Total cholesterol 78; Direct LDL: --; HDL: 20; Triglycerides:143    HgA1c:   TSH: Thyroid Stimulating Hormone, Serum: 1.45 uIU/mL      TELEMETRY: RSR 80's no events    DIAGNOSTIC TESTING:   Echocardiogram:   < from: TTE Echo Complete w/ Contrast w/ Doppler (02.21.23 @ 16:56) >  PHYSICIAN INTERPRETATION:  Left Ventricle: The left ventricular internal cavity size is normal. Left   ventricular wall thickness is normal.  Global LV systolic function was normal. Left ventricular ejection   fraction, by visual estimation, is 65 to 70%. Spectral Doppler shows   normal pattern of LV diastolic filling.  Right Ventricle: The right ventricular size is normal. RV systolic   function is normal.  Left Atrium: Normal left atrial size.  Right Atrium: Normal right atrial size.  Pericardium: There is no evidence of pericardial effusion.  Mitral Valve: The mitral valve is normal in structure. Mitral leaflet   mobility is normal. Mild mitral valve regurgitation is seen.  Tricuspid Valve: The tricuspid valve is normal in structure. Trivial   tricuspid regurgitation is visualized.  Aortic Valve: The aortic valve is trileaflet. No evidence of aortic   stenosis. No evidence of aortic valve regurgitation is seen.  Pulmonic Valve: The pulmonic valve is normal. Trace pulmonic valve   regurgitation.  Aorta: The aortic root is normal in size and structure.  Pulmonary Artery: The pulmonary artery is of normal size and origin.  Venous: The inferior vena cava was normal sized, with respiratory size   variation greater than 50%.

## 2023-02-27 NOTE — PROGRESS NOTE ADULT - NUTRITIONAL ASSESSMENT
This patient has been assessed with a concern for Malnutrition and has been determined to have a diagnosis/diagnoses of Severe protein-calorie malnutrition and Underweight (BMI < 19).    This patient is being managed with:   Diet Consistent Carbohydrate/No Snacks-  DASH/TLC {Sodium & Cholesterol Restricted} (DASH)  Supplement Feeding Modality:  Oral  Glucerna Shake Cans or Servings Per Day:  1       Frequency:  Three Times a day  Entered: Feb 24 2023  2:57PM    
This patient has been assessed with a concern for Malnutrition and has been determined to have a diagnosis/diagnoses of Severe protein-calorie malnutrition and Underweight (BMI < 19).    This patient is being managed with:   Diet Consistent Carbohydrate/No Snacks-  DASH/TLC {Sodium & Cholesterol Restricted} (DASH)  Supplement Feeding Modality:  Oral  Glucerna Shake Cans or Servings Per Day:  1       Frequency:  Three Times a day  Entered: Feb 24 2023  2:57PM    
This patient has been assessed with a concern for Malnutrition and has been determined to have a diagnosis/diagnoses of Severe protein-calorie malnutrition and Underweight (BMI < 19).    This patient is being managed with:   Diet NPO after Midnight-     NPO Start Date: 26-Feb-2023   NPO Start Time: 23:59  Except Medications  Entered: Feb 26 2023  1:50PM    Diet NPO after Midnight-     NPO Start Date: 26-Feb-2023   NPO Start Time: 23:59  Except Medications  Entered: Feb 26 2023  8:15AM    Diet Consistent Carbohydrate/No Snacks-  DASH/TLC {Sodium & Cholesterol Restricted} (DASH)  Supplement Feeding Modality:  Oral  Glucerna Shake Cans or Servings Per Day:  1       Frequency:  Three Times a day  Entered: Feb 24 2023  2:57PM    
This patient has been assessed with a concern for Malnutrition and has been determined to have a diagnosis/diagnoses of Severe protein-calorie malnutrition and Underweight (BMI < 19).    This patient is being managed with:   Diet DASH/TLC-  Sodium & Cholesterol Restricted  Entered: Feb 23 2023  9:13AM

## 2023-02-27 NOTE — PROGRESS NOTE ADULT - ASSESSMENT
48 y/o Turkmen speaking M w/ PMH of IDDM, chronic anemia (baseline Hb unknown), was sent from cardiology office for near syncopal event w/ episodes of hypotension while in ED upon standing and with labs notable for elevated troponins w/ EKG changes admitted for for further work up.  Trops have since trended down.  LHC postponed and heparin gtt d/c' in light of anemia.  Code stroke called on 2/23 due to acute onset Rt facial droop, rt sided weakness and slurred speech.  Pt upgraded to SDU.     1) Near Syncope   - Elevated Tropnin  - No CP at this time and tele w/out events   - Possibly demand ischemia from syncopal/hypotensive episodes  - Troponins down trending (0.23-->0.14)  - EKG showing new TWI in II, III, V4-V6 on 3rd EKG  - s/p Cardiac Cath today: Significant One Vessel CAD (Distal LAD 75%)(Small vessel). Normal LV Function (LVEF=70% and LVEDP=24 mmHg)   - Continue Aspirin and Lipitor  - Decrease Metoprolol to 12.5 mg BID   - Cardio follow up noted     2) AMS w/ acute onset Rt sided deficits  - Code stroke called on 2/23   - Symptoms now completely resolved   - CT Head / Perfusion Scan / CTA Head & Neck with no acute findings   - MRI with no acute infarct   - VS and blood glucose during event nL   - Neuro consult noted   - Stroke ruled out     3) Hypovolemic Hyponatremic hypochloremia   - suspect due to poor po intake / dehydration    - Improved with IVF  - Encourage po intake     4) Normocytic Anemia  - Pt reports hx of anemia requiring blood transfusions in the past  - Does not know baseline hb or why he has anemia   - Clinically malnutrition which can contribute   - Iron panel noted   - B12/folate levels nL   - No reports of melena or hematochezia but elevated BUN/Cr ratio could be from dehydration but FOBT ordered    - Monitor CBC and transfuse for Hb<8     5) IDDM  - A1c 8.3  - Accu checks and ISS  - Added Lantus 5 units in morning     6) Severe protein calorie malnutrition   - Pt cachectic   - Has poor po intake and unintentional weight loss   - Will recommend outpatient work up with PMD for age appropriate screening   - Added MVI and Glucerna   - Nutrition eval noted     DVT Prophylaxis -- Lovenox SQ    Dispo: D/C Home

## 2023-02-27 NOTE — DISCHARGE NOTE PROVIDER - DETAILS OF MALNUTRITION DIAGNOSIS/DIAGNOSES
This patient has been assessed with a concern for Malnutrition and was treated during this hospitalization for the following Nutrition diagnosis/diagnoses:     -  02/24/2023: Severe protein-calorie malnutrition   -  02/24/2023: Underweight (BMI < 19)

## 2023-02-27 NOTE — DISCHARGE NOTE PROVIDER - NSDCCPCAREPLAN_GEN_ALL_CORE_FT
PRINCIPAL DISCHARGE DIAGNOSIS  Diagnosis: Near syncope  Assessment and Plan of Treatment: s/p cath with non obstructive CAD.  NSTEMI ruled out.  CVA ruled out.  Continue medications as prescribed.  Follow up with Cardio in 2 weeks.

## 2023-02-27 NOTE — PROGRESS NOTE ADULT - SUBJECTIVE AND OBJECTIVE BOX
[facility] Stroke Service  Progress Note    HPI:  47yoM hx DM, on insulin, who was sent from an outpatient clinical for near syncopal event.  Pt stated he was going to clinical for routine visit, was in the waiting area when he had acute onset of lightheadedness, generalized weakness and almost passed out. Pt reported few episodes of lightheadedness and near syncopal events throughout the week. Had preceding constipation.   While in ED, pt also with 2 episodes of hypotension that occurred upon standing.  Admission labs notable for elevated troponin. Code stroke called 0907 AM 2/23/23 NIH 4 with right facial droop and right side weakness. Stroke team assessment at 0925 NIH 2 with left  facial palsy. CT witout evidence of an acute transcortical infarction or hemorrhage. CT angiogram without evidence of larger artery occlusion. Patient was not Tenecteplase candidate as per MD Long per d/w primary team given recent anemia and concern for bleeding.  Previous heparin regimen was d/c due to this concern by report.   patient reports chronic bilateral impaired visual fields as has cataracts.    PRE-MRS 0    Interval history: feels at [facility]    Review of systems (neurology): Denies headache or dizziness. Denies weakness/numbness.  Denies speech/language deficits. Denies diplopia/blurred vision.  Denies confusion    MEDICATIONS  (STANDING):  aspirin enteric coated 81 milliGRAM(s) Oral daily  atorvastatin 40 milliGRAM(s) Oral at bedtime  dextrose 5%. 1000 milliLiter(s) (100 mL/Hr) IV Continuous <Continuous>  dextrose 5%. 1000 milliLiter(s) (50 mL/Hr) IV Continuous <Continuous>  dextrose 50% Injectable 25 Gram(s) IV Push once  dextrose 50% Injectable 12.5 Gram(s) IV Push once  dextrose 50% Injectable 25 Gram(s) IV Push once  enoxaparin Injectable 40 milliGRAM(s) SubCutaneous every 24 hours  ferrous    sulfate 325 milliGRAM(s) Oral daily  glucagon  Injectable 1 milliGRAM(s) IntraMuscular once  influenza   Vaccine 0.5 milliLiter(s) IntraMuscular once  insulin glargine Injectable (LANTUS) 5 Unit(s) SubCutaneous every morning  insulin lispro (ADMELOG) corrective regimen sliding scale   SubCutaneous Before meals and at bedtime  metoprolol tartrate 25 milliGRAM(s) Oral two times a day  multivitamin 1 Tablet(s) Oral daily  sodium chloride 0.9% Bolus 250 milliLiter(s) IV Bolus once    MEDICATIONS  (PRN):  acetaminophen     Tablet .. 650 milliGRAM(s) Oral every 6 hours PRN Temp greater or equal to 38C (100.4F), Mild Pain (1 - 3), Moderate Pain (4 - 6)  dextrose Oral Gel 15 Gram(s) Oral once PRN Blood Glucose LESS THAN 70 milliGRAM(s)/deciliter    Vital Signs Last 24 Hrs  T(C): 36.8 (27 Feb 2023 11:24), Max: 37 (26 Feb 2023 16:51)  T(F): 98.2 (27 Feb 2023 11:24), Max: 98.6 (26 Feb 2023 16:51)  HR: 72 (27 Feb 2023 11:24) (72 - 79)  BP: 96/64 (27 Feb 2023 11:24) (92/55 - 145/91)  BP(mean): --  RR: 17 (27 Feb 2023 11:24) (15 - 18)  SpO2: 99% (27 Feb 2023 11:24) (98% - 99%)    Parameters below as of 27 Feb 2023 11:24  Patient On (Oxygen Delivery Method): room air    NEUROLOGICAL EXAM:   Mental status: Awake, alert, oriented x person, place, time, events, speech fluent, follows commands, normal memory, able to ID objects and function.   Cranial Nerves: right pupil opacified (cataract), left react 4-3, eomi. sym smile, palate elevates sym, tongue extends midline  Motor exam: Normal tone, no drift, 5/5 RUE, 5/5 RLE, 5/5 LUE, 5/5 LLE,  Sensation: Intact to light touch   Coordination/ Gait: No dysmetria, gait not tested    Recent neurological studies (images reviewed independently):    MR Head No Cont (02.25.23 @ 06:14)   No acute intracranial hemorrhage, acute infarction, extra-axial fluid   collection or hydrocephalus.    CT Brain Stroke Protocol (02.23.23 @ 09:36)   IMPRESSION: No evidence of an acute transcortical infarction or   hemorrhage. Findings discussed with Dr. Wiggins at 9:35 AM.    CT Angio Neck w/ IV Cont (02.23.23 @ 09:48)   CT PERFUSION:  Patient has only had less than 2 hours of symptoms may influence the CT   perfusion.  No core infarct or evidence of delayed mean transit time is identified.  CBF<30% volume: 0 ml  Tmax>6.0 s volume: 0 ml  Mismatch volume: 0 ml  Mismatch ratio: none  9.2 mm upper lobe pulmonary nodule within adjacent cystic cavity.   Dedicated chest CT is recommended.  5.1 millimeter left thyroid lobe nodule.    from: CT Angio Head w/ IV Cont (02.23.23 @ 09:48)   Negative CTP. No large vessel occlusion. Consider MRI of the brain as   clinically warranted.    TTE Echo Complete w/ Contrast w/ Doppler (02.21.23 @ 16:56)   Summary:   1. Left ventricular ejection fraction, by visual estimation, is 65 to   70%.   2. Normal global left ventricular systolic function.   3. Normal left atrial size.   4. Normal right atrial size.   5. There is no evidence of pericardial effusion.   6. Mild mitral valve regurgitation.    TTE Echo Complete w/ Contrast w/ Doppler (02.21.23 @ 16:56)   Summary:   1. Left ventricular ejection fraction, by visual estimation, is 65 to   70%.   2. Normal global left ventricular systolic function.   3. Normal left atrial size.   4. Normal right atrial size.   5. There is no evidence of pericardial effusion.   6. Mild mitral valve regurgitation.

## 2023-02-27 NOTE — DISCHARGE NOTE PROVIDER - CARE PROVIDER_API CALL
Isaiah Neely)  Cardiovascular Disease; Internal Medicine  [address], [address]  [place] [address]  Phone: [phone]  Fax: [phone]  Follow Up Time: 2 weeks    PMD - [facility],   [address] [place] [address]  Phone: [phone]  Fax: (   )    -  Established Patient  Follow Up Time: 1 week    Endocrinology,   Phone: (   )    -  Fax: (   )    -  Established Patient  Follow Up Time: 1 month

## 2023-02-27 NOTE — PROGRESS NOTE ADULT - REASON FOR ADMISSION
Near syncope, NSTEMI

## 2023-02-27 NOTE — CONSULT NOTE ADULT - SUBJECTIVE AND OBJECTIVE BOX
[facility]                                              INTERVENTIONAL CARDIOLOGY AT [facility]                                                   [address], [place] [address]                                             Telephone: [phone]. Fax:[phone]                                                       INTERVENTIONAL CARDIOLOGY CONSULTATION NOTE                                                                                             History obtained by: Patient and medical record  Community Cardiologist: clinic  Reason for Consultation: Evaluation for cardiac catheterization  Available pt records reviewed: Yes [ x ] No [  ]    Chief complaint:    Patient is a 47y old  Male who presents with a chief complaint of Near syncope, NSTEMI (26 Feb 2023 15:05)      HPI:  47yoM hx DM, on insulin, who was sent from an outpatient clinical for near syncopal event.  Pt states he was going to clinical for routine visit, was in the waiting area when he had acute onset of lightheadedness, generalized weakness and almost passed out. Pt reports few episodes of lightheadedness and near syncopal events throughout the week. Pt denies nausea, vomiting, diarrhea and states his appetite has been ‘okay’.  He reports he had been constipated in the week but had a bowel movement while in the ED.  While in ED, pt also with 2 episodes of hypotension that occurred upon standing.  Admission labs notable for elevated troponin. (20 Feb 2023 23:49). +NSTEMI  Trops have since trended down.  LHC postponed and heparin gtt d/c' in light of anemia.  Code stroke called on 2/23 due to acute onset Rt facial droop, rt sided weakness and slurred speech.  Stroke has been ruled out and deficits resolved.  Last chest pain was yesterday 6/10.     Anginal Class:        Angina (Class): II       Ischemic Symptoms: chest pain    Heart Failure:        Systolic/Diastolic/Combined: na       NYHA Class (within 2 weeks):       PAST MEDICAL HISTORY  No pertinent past medical history    Diabetes mellitus without complication        Associated Risk Factors:        Frailty Assessment: (none/mild/mod/severe): mild       Cerebrovascular Disease: N/A       Chronic Lung Disease: N/A       Peripheral Arterial Disease: N/A       Chronic Kidney Disease (if yes, what is GFR): N/A       Uncontrolled Diabetes (if yes, what is HgbA1C or FBS): N/A       Poorly Controlled Hypertension (if yes, what is SBP): N/A       Morbid Obesity (if yes, what is BMI): N/A       History of Recent Ventricular Arrhythmia: N/A       Inability to Ambulate Safely: N/A       Need for Therapeutic Anticoagulation: N/A       Antiplatelet or Contrast Allergy: N/A      PAST SURGICAL HISTORY  H/O hand surgery        SOCIAL HISTORY:    Swedish speaking    FAMILY HISTORY:  No pertinent family history in first degree relatives      Family History of Premature Cardiovascular Disease:  Yes [  ] No [  ]    HOME MEDICATIONS:  insulin: 10 unit(s) subcutaneous once a day (21 Feb 2023 01:00)      CURRENT CARDIAC MEDICATIONS:  metoprolol tartrate 25 milliGRAM(s) Oral two times a day      Antianginal Therapies:        Beta Blockers:  y       Calcium Channel Blockers: n       Long Acting Nitrates: n       Ranexa: n    ALLERGIES:   No Known Allergies      REVIEW OF SYMPTOMS:   CONSTITUTIONAL: o fever, no chills, no weight loss, no weight gain, no fatigue   CARDIOVASCULAR: No chest pain today  RESPIRATORY: no Shortness of breath, no cough, no wheezing  : No dysuria, no hematuria   GI: No dark color stool, no nausea, no diarrhea, no constipation, no abdominal pain   NEURO: No headache, no slurred speech   ALL OTHER REVIEW OF SYSTEMS ARE NEGATIVE.    VITAL SIGNS:  T(C): 36.8 (02-27-23 @ 11:24), Max: 37 (02-26-23 @ 16:51)  T(F): 98.2 (02-27-23 @ 11:24), Max: 98.6 (02-26-23 @ 16:51)  HR: 72 (02-27-23 @ 11:24) (72 - 79)  BP: 96/64 (02-27-23 @ 11:24) (92/55 - 145/91)  RR: 17 (02-27-23 @ 11:24) (15 - 18)  SpO2: 99% (02-27-23 @ 11:24) (98% - 99%)    INTAKE AND OUTPUT:     02-27 @ 07:01  -  02-27 @ 11:50  --------------------------------------------------------  IN: 240 mL / OUT: 0 mL / NET: 240 mL        PHYSICAL EXAM:  Constitutional: Comfortable . No acute distress.   HEENT: Atraumatic and normocephalic , neck is supple . no JVD. No carotid bruit.  CNS: A&Ox3. No focal deficits.   Respiratory: CTAB, unlabored.  Decreased b/l bases  Cardiovascular: RRR normal s1 s2. No murmur. No rubs or gallop.  Gastrointestinal: Soft, non-tender. +Bowel sounds.   Extremities: 2+ Peripheral Pulses, No clubbing, cyanosis, or edema  Psychiatric: Calm . no agitation.   Skin: Warm and dry, no ulcers on extremities     LABS:  ( 23 Feb 2023 09:20 )  Troponin T  0.17<H>,  CPK  46   , CKMB  X    , BNP X        , ( 21 Feb 2023 06:53 )  Troponin T  0.14<H>,  CPK  35   , CKMB  X    , BNP X        , ( 21 Feb 2023 00:36 )  Troponin T  0.15<H>,  CPK  40   , CKMB  X    , BNP X                                  8.6    8.41  )-----------( 456      ( 27 Feb 2023 08:35 )             26.4     02-27    132<L>  |  96  |  23.0<H>  ----------------------------<  189<H>  5.1   |  25.0  |  0.96    Ca    8.8      27 Feb 2023 08:35  Mg     1.8     02-27    TPro  7.3  /  Alb  2.8<L>  /  TBili  <0.2<L>  /  DBili  x   /  AST  11  /  ALT  <5  /  AlkPhos  119  02-27                  ECG: NSR   Prior ECG: Yes [ x ] No [  ]    CARDIAC TESTING   ECHO:  < from: TTE Echo Complete w/ Contrast w/ Doppler (02.21.23 @ 16:56) >  Summary:   1. Left ventricular ejection fraction, by visual estimation, is 65 to   70%.   2. Normal global left ventricular systolic function.   3. Normal left atrial size.   4. Normal right atrial size.   5. There is no evidence of pericardial effusion.   6. Mild mitral valve regurgitation.    < end of copied text >      STRESS: na    Cardiac Interventions: na    CATH: na    ELECTROPHYSIOLOGY:  na

## 2023-02-27 NOTE — PROGRESS NOTE ADULT - PROVIDER SPECIALTY LIST ADULT
Cardiology
Intervent Pulmonology
Cardiology
Hospitalist
Cardiology
Hospitalist
Neurology
Hospitalist
Hospitalist
Cardiology

## 2023-02-27 NOTE — DISCHARGE NOTE PROVIDER - HOSPITAL COURSE
48 y/o Palestinian speaking M w/ PMH of IDDM, chronic anemia (baseline Hb unknown), was sent from cardiology office for near syncopal event w/ episodes of hypotension while in ED upon standing and with labs notable for elevated troponins w/ EKG changes admitted for for further work up.  Trops have since trended down.  LHC postponed and heparin gtt d/c' in light of anemia.  Code stroke called on 2/23 due to acute onset Rt facial droop, rt sided weakness and slurred speech.  Pt upgraded to SDU. CT and MRI with no acute stroke.   Cardiology recommending LHC and it showed non obstructive CAD. He is feeling much better and is stable for discharge. He is encouraged for oral intake.

## 2023-02-27 NOTE — PROGRESS NOTE ADULT - NS ATTEND AMEND GEN_ALL_CORE FT
Patient seen and examined at bedside and notes to be doing well and pending [facility]   Presented as NSTEMI     46yo M w/ T2DM arrived by ambulance after experiencing weakness and near syncope without chest pain or shortness of breath. Patient seen and examined at bedside and notes to be doing well and pending [facility]   Presented as NSTEMI     48yo M w/ T2DM arrived by ambulance after experiencing weakness and near syncope without chest pain or shortness of breath.  - NSTEMI, pending [facility]   - -Ischemic eval was complicated by change in neuro status / anemia. Since then MRI completed, no acute findings.   -GDMT: metoprolol, ASA, statin consider adding low dose ace ARB if bp permits   holding now as BP still marginal   hemodynamically stable without chest pain or SOB   TTE done shows normal Global LV systolic function was normal. Left ventricular ejection  fraction, by visual estimation, is 65 to 70%.   no events on telemetry     will follow up post [facility]

## 2023-02-27 NOTE — CONSULT NOTE ADULT - ASSESSMENT
Indication: NSTEMI  FCO Score: 3    Risk Stratification:  ASA: 3  Mallampati: 2  Bleeding Risk: 4.6%  Creatinine: 0.96  GFR: 98    Coronary anatomy: unknown    Plan/Recommendations:   -plan for [facility]  -preferred access: RRA (if amendable d/t arm surgery)  -patient seen and examined  -confirmed appropriate NPO duration  -ECG and Labs reviewed  -Aspirin 81mg po pre-cath  -NS 250mL IV bolus pre-cath  -procedure discussed with patient; risks and benefits explained, questions answered  -consent obtained by attending IC-via interperter

## 2023-02-28 PROBLEM — Z00.00 ENCOUNTER FOR PREVENTIVE HEALTH EXAMINATION: Status: ACTIVE | Noted: 2023-02-28

## 2023-09-12 NOTE — ED PROVIDER NOTE - OBJECTIVE STATEMENT
47y Male with history of DM BIBEMS for weakness and near syncope while at clinic without headache, chest pain, shortness of breath. Patient reports not BM x 8 days but reports flatus without abdominal pain, nausea, vomiting. Denies fevers, chills, cough, focal neurologic symptoms. I have personally evaluated and examined the patient. The Attending was available to me as a supervising provider if needed. I have personally evaluated and examined the patient. The Attending was available to me as a supervising provider if needed./Attending Attestation (For Attendings USE Only)... Attending Attestation (For Attendings USE Only)...